# Patient Record
Sex: MALE | Race: WHITE | NOT HISPANIC OR LATINO | Employment: FULL TIME | URBAN - METROPOLITAN AREA
[De-identification: names, ages, dates, MRNs, and addresses within clinical notes are randomized per-mention and may not be internally consistent; named-entity substitution may affect disease eponyms.]

---

## 2019-12-02 ENCOUNTER — CONSULT (OUTPATIENT)
Dept: GASTROENTEROLOGY | Facility: CLINIC | Age: 49
End: 2019-12-02
Payer: COMMERCIAL

## 2019-12-02 VITALS
HEART RATE: 104 BPM | HEIGHT: 70 IN | WEIGHT: 181 LBS | BODY MASS INDEX: 25.91 KG/M2 | SYSTOLIC BLOOD PRESSURE: 122 MMHG | DIASTOLIC BLOOD PRESSURE: 74 MMHG

## 2019-12-02 DIAGNOSIS — R19.7 DIARRHEA, UNSPECIFIED TYPE: Primary | ICD-10-CM

## 2019-12-02 DIAGNOSIS — R19.4 CHANGE IN BOWEL HABITS: Primary | ICD-10-CM

## 2019-12-02 PROCEDURE — 99244 OFF/OP CNSLTJ NEW/EST MOD 40: CPT | Performed by: INTERNAL MEDICINE

## 2019-12-02 RX ORDER — LORAZEPAM 2 MG/1
2 TABLET ORAL 4 TIMES DAILY
COMMUNITY

## 2019-12-02 RX ORDER — HYDROCHLOROTHIAZIDE 12.5 MG/1
12.5 CAPSULE, GELATIN COATED ORAL DAILY
COMMUNITY
End: 2020-03-24 | Stop reason: ALTCHOICE

## 2019-12-02 RX ORDER — FEXOFENADINE HCL 180 MG/1
180 TABLET ORAL DAILY
COMMUNITY
End: 2020-03-24 | Stop reason: ALTCHOICE

## 2019-12-02 RX ORDER — NIACIN 1000 MG/1
1000 TABLET, EXTENDED RELEASE ORAL
COMMUNITY
End: 2020-03-24 | Stop reason: ALTCHOICE

## 2019-12-02 RX ORDER — ASPIRIN 81 MG/1
81 TABLET ORAL DAILY
COMMUNITY
End: 2020-04-28 | Stop reason: ALTCHOICE

## 2019-12-02 RX ORDER — LOVASTATIN 40 MG/1
40 TABLET ORAL
COMMUNITY
End: 2020-03-24 | Stop reason: ALTCHOICE

## 2019-12-02 RX ORDER — AMLODIPINE BESYLATE AND ATORVASTATIN CALCIUM 10; 40 MG/1; MG/1
1 TABLET, FILM COATED ORAL DAILY
COMMUNITY

## 2019-12-02 RX ORDER — MONTELUKAST SODIUM 10 MG/1
10 TABLET ORAL
COMMUNITY
End: 2020-03-24 | Stop reason: ALTCHOICE

## 2019-12-02 RX ORDER — ACETAMINOPHEN 160 MG
4000 TABLET,DISINTEGRATING ORAL DAILY
COMMUNITY

## 2019-12-02 RX ORDER — DICYCLOMINE HYDROCHLORIDE 10 MG/1
10 CAPSULE ORAL DAILY
Qty: 30 CAPSULE | Refills: 2 | Status: SHIPPED | OUTPATIENT
Start: 2019-12-02 | End: 2020-02-11 | Stop reason: SDUPTHER

## 2019-12-02 NOTE — PATIENT INSTRUCTIONS
Treat rectal discomfort with topical zinc oxide after stools and at bedtime   use dicyclomine once daily for colon symptoms   schedule EGD/colonoscopy,  Possibilities include irritable bowel, microscopic colitis, celiac disease, Crohn's disease/ulcerative colitis, small intestinal bacterial overgrowth      consume gluten, the equivalent of 1 slice of bread or more, daily for 1 week leading up to procedures

## 2019-12-03 NOTE — PROGRESS NOTES
[facility] - Outpatient Consultation  James Adame 51 y o  male MRN: [id number]  Encounter: [id number]    ASSESSMENT AND PLAN:      1  Diarrhea, unspecified type  Progressive complaint  Only mild improvement with dietary restrictions including a bland diet and gluten free diet  He is concerned about celiac disease due to his daughter's history  Workup includes negative celiac serologies (was already gluten free) and normal thyroid studies  Differential mainly includes IBS-D/functional diarrhea, microscopic colitis, inflammatory bowel disease, small intestinal bacterial overgrowth, or celiac disease  Patent is agreeable to colonoscopy to assess for IBD and microscopic colitis  We will perform EGD at that time to assess for for celiac disease, he will consume gluten for 1 week prior to this exam   In the interim, will try low-dose dicyclomine and titrate as indicated    - dicyclomine (BENTYL) 10 mg capsule; Take 1 capsule (10 mg total) by mouth daily  Dispense: 30 capsule; Refill: 2    2  Rectal pain  No fissure on exam   He has a small, non thrombosed internal hemorrhoid in the left lateral area  He also has skin excoriation near by  I recommended topical zinc oxide, will assess for further pathology at the time of colonoscopy  Followup Appointment:  Pending EGD/colonoscopy  ______________________________________________________________________    Chief Complaint   Patient presents with    Change in Bowel Habits, back pain, rectal pain     Referred by Dr Rashmi Miller Daughter has celiac so pt is concerned about that       HPI:   James Adame is a 51y o  year old male who presents for consultation at the request of his PCP regarding progressive diarrhea  Symptoms have been present for several years but have worsened over the past year  He complains of frequent stools throughout the day  It interferes with his work and activities of daily living    Stools are urgent and soft  He sees mucus but denies significant rectal bleeding  When entering a new place, he has to look for the bathroom immediately  He has worked on dietary manipulation, his daughter has celiac disease any started gluten free diet with minimal change in symptoms  He tried a bland diet with some improvement in severity  He denies any nocturnal symptoms  He has lost a small amount of weight with dietary changes  He denies any enedina abdominal pain  He has perianal discomfort with stools  He has tried hydrocortisone cream with little relief  He uses Culturelle with no change in symptoms  Historical Information   Past Medical History:   Diagnosis Date    ADHD     Anxiety     Hyperlipidemia      History reviewed  No pertinent surgical history  Social History     Substance and Sexual Activity   Alcohol Use Not Currently     Social History     Substance and Sexual Activity   Drug Use Not on file     Social History     Tobacco Use   Smoking Status Never Smoker   Smokeless Tobacco Never Used     Family History   Problem Relation Age of Onset    Leukemia Father     Colon cancer Neg Hx     Colon polyps Neg Hx        Meds/Allergies     Current Outpatient Medications:     amLODIPine-atorvastatin (CADUET) 10-10 MG per tablet    aspirin (ECOTRIN LOW STRENGTH) 81 mg EC tablet    cholecalciferol (VITAMIN D3) 1,000 units tablet    fexofenadine (ALLEGRA) 180 MG tablet    hydrochlorothiazide (MICROZIDE) 12 5 mg capsule    LORazepam (ATIVAN) 2 mg tablet    lovastatin (MEVACOR) 40 MG tablet    montelukast (SINGULAIR) 10 mg tablet    niacin (NIASPAN) 1000 MG CR tablet    dicyclomine (BENTYL) 10 mg capsule    PEG-KCl-NaCl-NaSulf-Na Asc-C (PLENVU) 140 g SOLR    No Known Allergies    PHYSICAL EXAM:    Blood pressure 122/74, pulse 104, height 5' 10" (1 778 m), weight 82 1 kg (181 lb)  Body mass index is 25 97 kg/m²    General Appearance: NAD, cooperative, alert, very anxious  Eyes: Anicteric, PERRLA, EOMI  ENT:  Normocephalic, atraumatic, normal mucosa  Neck:  Supple, symmetrical, trachea midline,   Resp:  Clear to auscultation bilaterally; no rales, rhonchi or wheezing; respirations unlabored   CV:  S1 S2, Regular rate and rhythm; no murmur, rub, or gallop  GI:  Soft, non-tender, non-distended; normal bowel sounds; no masses, no organomegaly   Rectal: Deferred  Musculoskeletal: No cyanosis, clubbing or edema  Normal ROM  Skin:  No jaundice, rashes, or lesions   Heme/Lymph: No palpable cervical lymphadenopathy  Psych: Normal affect, good eye contact  Neuro: No gross deficits, AAOx3    Lab Results:   No results found for: WBC, HGB, HCT, MCV, PLT  No results found for: NA, K, CL, CO2, ANIONGAP, BUN, CREATININE, GLUCOSE, GLUF, CALCIUM, CORRECTEDCA, AST, ALT, ALKPHOS, PROT, BILITOT, EGFR  No results found for: IRON, TIBC, FERRITIN  No results found for: LIPASE  Reviewed recent labs including CBC, CMP and celiac panel which were negative with the exception of a mild bilirubin elevation  (chronic)  Negative fecal occult blood  Normal TSH February 2019  Radiology Results:   No results found  REVIEW OF SYSTEMS:    CONSTITUTIONAL: Denies any fever, chills, rigors, and weight loss  HEENT: No earache or tinnitus  Denies hearing loss or visual disturbances  CARDIOVASCULAR: No chest pain or palpitations  RESPIRATORY: Denies any cough, hemoptysis, shortness of breath or dyspnea on exertion  GASTROINTESTINAL: As noted in the History of Present Illness  GENITOURINARY: No problems with urination  Denies any hematuria or dysuria  NEUROLOGIC: No dizziness or vertigo, denies headaches  MUSCULOSKELETAL: Denies any muscle or joint pain  SKIN: Denies skin rashes or itching  ENDOCRINE: Denies excessive thirst  Denies intolerance to heat or cold  PSYCHOSOCIAL: Denies depression or anxiety  Denies any recent memory loss

## 2020-01-02 PROCEDURE — 88305 TISSUE EXAM BY PATHOLOGIST: CPT | Performed by: PATHOLOGY

## 2020-01-03 ENCOUNTER — LAB REQUISITION (OUTPATIENT)
Dept: LAB | Facility: HOSPITAL | Age: 50
End: 2020-01-03
Payer: COMMERCIAL

## 2020-01-03 DIAGNOSIS — K64.0 FIRST DEGREE HEMORRHOIDS: ICD-10-CM

## 2020-01-03 DIAGNOSIS — K63.3 ULCER OF INTESTINE: ICD-10-CM

## 2020-01-03 DIAGNOSIS — R19.7 DIARRHEA, UNSPECIFIED: ICD-10-CM

## 2020-01-10 ENCOUNTER — TELEPHONE (OUTPATIENT)
Dept: GASTROENTEROLOGY | Facility: CLINIC | Age: 50
End: 2020-01-10

## 2020-01-10 DIAGNOSIS — R19.4 CHANGE IN BOWEL HABITS: Primary | ICD-10-CM

## 2020-01-10 NOTE — TELEPHONE ENCOUNTER
Dr Adrian Sit - Pt called, very nervous, states his soft palette is now rough, rigid and sore since his procedure 1/2/2020  States his gums are also red and inflamed  Does not feel as if he is starting with a cold/sore throat  Feels changes in gums are not dental related; questions if it's a reaction to bentyl recently prescribed? Or his "condition", as he states it  Is scheduled for follow up 2/11, but does not want to wait that long  Would like a call back with your suggestions/thoughts  [phone]

## 2020-01-10 NOTE — TELEPHONE ENCOUNTER
Long conversation with patient  Review pathology  He will stop Bentyl and transition to Levsin    Okay to use saltwater gargles as needed

## 2020-01-17 ENCOUNTER — TELEPHONE (OUTPATIENT)
Dept: GASTROENTEROLOGY | Facility: CLINIC | Age: 50
End: 2020-01-17

## 2020-01-17 NOTE — TELEPHONE ENCOUNTER
Call from service, [phone], I spoke with patient he follows with Dr Matthew Yo  He called today complaining of the same symptoms he saw Matthew Yo for in December, but no longer having diarrhea  Still with lower abd pain ( all across) radiating to back pain (bilateral kidney area), and rectal pain  He is anxious, embarrassed for calling, but feeling worse and doesn't know what else to do  He had an office visit with Dr Matthew Yo 12/2/19 then a combo 1/2/2020  10 year colon rectal and NO EGD recall  He has originally prescribed bentyl, which helped with the pain, but caused moderate dry mouth, inflamed gums  Dr Matthew Yo recommended stopping bentyl and prescribed Levsin  Patient feels the Mardene Isiah is helping to bind him up as he is no longer having the diarrhea  He's been taking the Levsin for a week now; having to pay out of pocket as it's not covered by insurance  On the other hand his lower abd, back & rectal pain have not improved  He's afraid to eat  Not sleeping at night  The zinc oxide is not helping his rectal pain/itching  It's almost like his rectum is raw/excoriated  Nothing noted on colonoscopy  I advised he try tucks pads to help with the itching  Let the area air dry then apply antifungal cream/zinc oxide QID and prn  Please call patient with further recommendations for pain   [phone]

## 2020-02-11 ENCOUNTER — OFFICE VISIT (OUTPATIENT)
Dept: GASTROENTEROLOGY | Facility: CLINIC | Age: 50
End: 2020-02-11
Payer: COMMERCIAL

## 2020-02-11 VITALS
SYSTOLIC BLOOD PRESSURE: 122 MMHG | HEIGHT: 70 IN | HEART RATE: 66 BPM | BODY MASS INDEX: 24.48 KG/M2 | WEIGHT: 171 LBS | DIASTOLIC BLOOD PRESSURE: 84 MMHG

## 2020-02-11 DIAGNOSIS — R19.7 DIARRHEA, UNSPECIFIED TYPE: ICD-10-CM

## 2020-02-11 DIAGNOSIS — K58.0 IRRITABLE BOWEL SYNDROME WITH DIARRHEA: ICD-10-CM

## 2020-02-11 DIAGNOSIS — K59.1 FUNCTIONAL DIARRHEA: Primary | ICD-10-CM

## 2020-02-11 DIAGNOSIS — K62.89 RECTAL PAIN: ICD-10-CM

## 2020-02-11 PROCEDURE — 99215 OFFICE O/P EST HI 40 MIN: CPT | Performed by: INTERNAL MEDICINE

## 2020-02-11 RX ORDER — DICYCLOMINE HYDROCHLORIDE 10 MG/1
10 CAPSULE ORAL DAILY
Qty: 30 CAPSULE | Refills: 2 | Status: SHIPPED | OUTPATIENT
Start: 2020-02-11 | End: 2020-04-28 | Stop reason: ALTCHOICE

## 2020-02-11 NOTE — PROGRESS NOTES
[facility] - Outpatient Follow-up Note  Nay Rebollar 51 y o  male MRN: [id number]  Encounter: [id number]    ASSESSMENT AND PLAN:      1  Functional diarrhea  2  Irritable bowel syndrome with diarrhea  No etiology on EGD/colonoscopy  Diarrhea has improved with dicyclomine, dosing is limited by dry mouth  Levsin was not effective  He continues a low FODMAP diet and is restricting dairy  Will proceed with a breath test for bacterial overgrowth  If negative, will transition to [address] [place] [address]  With his anxiety, he may benefit from amitriptyline  Recent blood tests suggested lactose intolerance, he will continue to minimize dairy as before  3  Rectal pain  Rectal excoriations, recommended zinc oxide and wet wipes  - dicyclomine (BENTYL) 10 mg capsule; Take 1 capsule (10 mg total) by mouth daily  Dispense: 30 capsule; Refill: 2    I have spent 45 minutes with Patient  today in which greater than 50% of this time was spent in counseling/coordination of care regarding Diagnostic results, Prognosis and Intructions for management  Followup Appointment:  Pending breath test, keep March office visit  ______________________________________________________________________    Chief Complaint   Patient presents with    Follow-up     scope    Lactose Intolerance     HPI:  The patient returns for follow-up on diarrhea and abdominal pain  He was last seen at the time of EGD/colonoscopy which were negative for celiac disease, IBD, and microscopic colitis  He was prescribed Bentyl but dosing was limited by dry mouth  We transition to Levsin but was not as effective  He is currently using Bentyl with control diarrhea  He still has abdominal cramping along with flank pain  He denies any nausea or vomiting  Stools are more formed and less mucousy  He still has rectal burning  Still is very anxious about determining an exact diagnosis  He still significantly limits calories    He adheres to a low FODMAP diet and minimizes dairy  He has introduced gluten  Despite his diarrhea improving he is still anxious to eat at restaurants  Historical Information   Past Medical History:   Diagnosis Date    ADHD     Anxiety     Hyperlipidemia      No past surgical history on file  Social History     Substance and Sexual Activity   Alcohol Use Not Currently     Social History     Substance and Sexual Activity   Drug Use Not on file     Social History     Tobacco Use   Smoking Status Never Smoker   Smokeless Tobacco Never Used     Family History   Problem Relation Age of Onset    Leukemia Father     Colon cancer Neg Hx     Colon polyps Neg Hx          Current Outpatient Medications:     amLODIPine-atorvastatin (CADUET) 10-10 MG per tablet    cholecalciferol (VITAMIN D3) 1,000 units tablet    dicyclomine (BENTYL) 10 mg capsule    fexofenadine (ALLEGRA) 180 MG tablet    LORazepam (ATIVAN) 2 mg tablet    aspirin (ECOTRIN LOW STRENGTH) 81 mg EC tablet    hydrochlorothiazide (MICROZIDE) 12 5 mg capsule    lovastatin (MEVACOR) 40 MG tablet    montelukast (SINGULAIR) 10 mg tablet    niacin (NIASPAN) 1000 MG CR tablet  No Known Allergies  Reviewed medications and allergies and updated as indicated    PHYSICAL EXAM:    Blood pressure 122/84, pulse 66, height 5' 10" (1 778 m), weight 77 6 kg (171 lb)  Body mass index is 24 54 kg/m²  General Appearance: NAD, cooperative, alert  Eyes: Anicteric, PERRLA, EOMI  ENT:  Normocephalic, atraumatic, normal mucosa  Neck:  Supple, symmetrical, trachea midline  Resp:  Clear to auscultation bilaterally; no rales, rhonchi or wheezing; respirations unlabored   CV:  S1 S2, Regular rate and rhythm; no murmur, rub, or gallop  GI:  Soft, non-tender, non-distended; normal bowel sounds; no masses, no organomegaly   Rectal:  Perirectal excoriations  Musculoskeletal: No cyanosis, clubbing or edema  Normal ROM    Skin:  No jaundice, rashes, or lesions   Heme/Lymph: No palpable cervical lymphadenopathy  Psych: Normal affect, good eye contact  Neuro: No gross deficits, AAOx3    Lab Results:   No results found for: WBC, HGB, HCT, MCV, PLT  No results found for: NA, K, CL, CO2, ANIONGAP, BUN, CREATININE, GLUCOSE, GLUF, CALCIUM, CORRECTEDCA, AST, ALT, ALKPHOS, PROT, BILITOT, EGFR  No results found for: IRON, TIBC, FERRITIN  No results found for: LIPASE    Radiology Results:   No results found

## 2020-02-17 ENCOUNTER — CLINICAL SUPPORT (OUTPATIENT)
Dept: GASTROENTEROLOGY | Facility: CLINIC | Age: 50
End: 2020-02-17
Payer: COMMERCIAL

## 2020-02-17 DIAGNOSIS — K59.1 FUNCTIONAL DIARRHEA: ICD-10-CM

## 2020-02-17 DIAGNOSIS — K58.0 IRRITABLE BOWEL SYNDROME WITH DIARRHEA: Primary | ICD-10-CM

## 2020-02-17 PROCEDURE — 91065 BREATH HYDROGEN/METHANE TEST: CPT | Performed by: INTERNAL MEDICINE

## 2020-02-17 NOTE — PROGRESS NOTES
[facility]  [address] [place] [address]   [phone]    Bacterial Overgrowth Analytical Record    Damion Ponce 51 y o  male MRN: [id number]      Date of Test: 2/17/2020    Substrate Given: Lactulose    Ordering Provider: Dr Jackeline Kern Assistant: All    Symptoms: diarrhea and IBS     The patient presents for bacterial overgrowth testing  Patient fasted overnight  Baseline readings obtained  substrate was administered, and the patient drink without difficulty  Breath test performed every 20 min for a total of 3 hr    Sample Clock Time ppmH2 ppmCH4 Co2% Elvi   Baseline 8:04 am   0 2 5 4 0 99   #1  20 minutes 8:29 am 3 2 4 6 1 15   #2  40 minutes 8:49 am 5 2 4 6 1 15   #3  60 minutes 9:09 am 5 2 4 9 1 08   #4  80 minutes 9:29 am 5 2 5 2 1 01   #5  100 minutes 9:49 am 6 2 5 3 1 00   #6  120 minutes 10:09 am 17 3 5 3 1 00   #7  140 minutes 10:30 am 41 4 4 9 1 08   #8  160 minutes 10:50 am 56 5 5 5 0 97   #9  180 minutes 11:10 am 55 5 4 2 1 26       Physician interpretation:  Negative study    Patient contacted via portal (I replied to his portal message)  Will prescribe Viberzi  Keep upcoming office visit

## 2020-03-24 ENCOUNTER — TELEMEDICINE (OUTPATIENT)
Dept: GASTROENTEROLOGY | Facility: CLINIC | Age: 50
End: 2020-03-24
Payer: COMMERCIAL

## 2020-03-24 VITALS — WEIGHT: 178 LBS | BODY MASS INDEX: 25.48 KG/M2 | HEIGHT: 70 IN

## 2020-03-24 DIAGNOSIS — K58.0 IRRITABLE BOWEL SYNDROME WITH DIARRHEA: ICD-10-CM

## 2020-03-24 PROCEDURE — 99213 OFFICE O/P EST LOW 20 MIN: CPT | Performed by: INTERNAL MEDICINE

## 2020-03-24 RX ORDER — PROPRANOLOL/HYDROCHLOROTHIAZID 40 MG-25MG
1 TABLET ORAL DAILY
COMMUNITY
End: 2021-03-15 | Stop reason: ALTCHOICE

## 2020-03-24 NOTE — LETTER
March 24, 2020     MD Samina [address]  [address]    Patient: Dede Vizcarra   YOB: 1970   Date of Visit: 3/24/2020       Dear Dr Leah Pozo: Thank you for referring Dede Vizcarra to me for evaluation  Below are my notes for this consultation  If you have questions, please do not hesitate to call me  I look forward to following your patient along with you  Sincerely,        Diaz Miller DO        CC: No Recipients  Diaz Miller DO  3/24/2020  5:16 PM  Sign at close encounter    Virtual Regular Visit    Problem List Items Addressed This Visit     None      Visit Diagnoses     Irritable bowel syndrome with diarrhea        Relevant Medications    Eluxadoline 75 MG TABS        1, IBS-D  significant improvement with the addition of Viberzi  He is currently taking a half tablet once daily  This is much less than the standard dosing  Will transition to 75 mg, initially once daily titrating as tolerated  Will check basic labs to assess for dehydration with his urination complaints  Reason for visit is follow-up on IBS-D    Encounter provider Diaz Miller DO    Provider located at [address] [address] [phone]      Recent Visits  No visits were found meeting these conditions  Showing recent visits within past 7 days and meeting all other requirements     Future Appointments  No visits were found meeting these conditions  Showing future appointments within next 150 days and meeting all other requirements        After connecting through [employer], the patient was identified by name and date of birth  Dede Vizcarra was informed that this is a telemedicine visit and that the visit is being conducted through [address] [place] [address] which may not be secure and therefore, might not be HIPAA-compliant  My office door was closed   No one else was in the room   He acknowledged consent and understanding of privacy and security of the video platform  The patient has agreed to participate and understands they can discontinue the visit at any time  Subjective  Lalitha Segovia is a 53 y o  male who presents for via virtual visit for follow-up on IBS-d  In February  He complained of dry mouth with dicyclomine  He was started on Viberzi 100 mg twice daily  Taking it twice daily lead to constipation  He current takes half tablet once daily  He has about 4 formed stools daily with no further diarrhea  He is much less mucus per rectum  He denies any abdominal pain  He still complains of some throat dryness  He denies any significant rectal bleeding  His main complaint today is increased urination and some nocturia  He denies any urinary bleeding  He still adheres to FODMAP diet but would like to advance his diet  Past Medical History:   Diagnosis Date    ADHD     Anxiety     Hyperlipidemia        Past Surgical History:   Procedure Laterality Date    COLONOSCOPY      10 year recall       Current Outpatient Medications   Medication Sig Dispense Refill    amLODIPine-atorvastatin (CADUET) 10-40 MG per tablet Take 1 tablet by mouth daily       Cholecalciferol (VITAMIN D3) 50 MCG (2000 UT) capsule Take 4,000 Units by mouth daily       LORazepam (ATIVAN) 2 mg tablet Take 2 mg by mouth 4 (four) times a day      Multiple Vitamins-Minerals (CENTRUM SILVER PO) Take 1 tablet by mouth daily      Turmeric 500 MG CAPS Take 1 capsule by mouth daily      aspirin (ECOTRIN LOW STRENGTH) 81 mg EC tablet Take 81 mg by mouth daily      dicyclomine (BENTYL) 10 mg capsule Take 1 capsule (10 mg total) by mouth daily 30 capsule 2    Eluxadoline 75 MG TABS Take 1 tablet (75 mg total) by mouth 2 (two) times a day 60 tablet 5     No current facility-administered medications for this visit  No Known Allergies    Review of Systems   Eyes: Positive for itching  Genitourinary: Positive for frequency  All other systems reviewed and are negative  Physical Exam   Constitutional: He is oriented to person, place, and time  He appears well-developed and well-nourished  HENT:   Head: Normocephalic  Eyes: Pupils are equal, round, and reactive to light  Neck: Normal range of motion  Pulmonary/Chest: Effort normal    Abdominal: Soft  Neurological: He is alert and oriented to person, place, and time  Skin: Skin is warm and dry  Psychiatric: He has a normal mood and affect  His behavior is normal         I spent 24 minutes with the patient during this visit

## 2020-03-28 LAB
ALBUMIN SERPL-MCNC: 4.6 G/DL (ref 4–5)
ALBUMIN/GLOB SERPL: 2.1 {RATIO} (ref 1.2–2.2)
ALP SERPL-CCNC: 60 IU/L (ref 39–117)
ALT SERPL-CCNC: 14 IU/L (ref 0–44)
AST SERPL-CCNC: 16 IU/L (ref 0–40)
BILIRUB SERPL-MCNC: 2 MG/DL (ref 0–1.2)
BUN SERPL-MCNC: 15 MG/DL (ref 6–24)
BUN/CREAT SERPL: 16 (ref 9–20)
CALCIUM SERPL-MCNC: 9.2 MG/DL (ref 8.7–10.2)
CHLORIDE SERPL-SCNC: 98 MMOL/L (ref 96–106)
CO2 SERPL-SCNC: 28 MMOL/L (ref 20–29)
CREAT SERPL-MCNC: 0.96 MG/DL (ref 0.76–1.27)
ERYTHROCYTE [DISTWIDTH] IN BLOOD BY AUTOMATED COUNT: 13.4 % (ref 11.6–15.4)
EST. AVERAGE GLUCOSE BLD GHB EST-MCNC: 114 MG/DL
GLOBULIN SER-MCNC: 2.2 G/DL (ref 1.5–4.5)
GLUCOSE SERPL-MCNC: 82 MG/DL (ref 65–99)
HBA1C MFR BLD: 5.6 % (ref 4.8–5.6)
HCT VFR BLD AUTO: 42.5 % (ref 37.5–51)
HGB BLD-MCNC: 14.1 G/DL (ref 13–17.7)
MCH RBC QN AUTO: 29.1 PG (ref 26.6–33)
MCHC RBC AUTO-ENTMCNC: 33.2 G/DL (ref 31.5–35.7)
MCV RBC AUTO: 88 FL (ref 79–97)
PLATELET # BLD AUTO: 297 X10E3/UL (ref 150–450)
POTASSIUM SERPL-SCNC: 4.3 MMOL/L (ref 3.5–5.2)
PROT SERPL-MCNC: 6.8 G/DL (ref 6–8.5)
RBC # BLD AUTO: 4.84 X10E6/UL (ref 4.14–5.8)
SL AMB EGFR AFRICAN AMERICAN: 107 ML/MIN/1.73
SL AMB EGFR NON AFRICAN AMERICAN: 92 ML/MIN/1.73
SODIUM SERPL-SCNC: 139 MMOL/L (ref 134–144)
TSH SERPL DL<=0.005 MIU/L-ACNC: 1.57 UIU/ML (ref 0.45–4.5)
WBC # BLD AUTO: 5.7 X10E3/UL (ref 3.4–10.8)

## 2020-03-30 ENCOUNTER — TELEPHONE (OUTPATIENT)
Dept: GASTROENTEROLOGY | Facility: CLINIC | Age: 50
End: 2020-03-30

## 2020-03-30 NOTE — TELEPHONE ENCOUNTER
Patient advised Kenney Pacheco is off today and then rounding and would px hear later this week re: results

## 2020-03-30 NOTE — TELEPHONE ENCOUNTER
Pt asks for CB to [phone] w/ results of labs done Sat at [facility]  He can see partial info on [employer]

## 2020-04-01 LAB
BEEF IGE QN: <0.1 KU/L
CHOCOLATE IGE QN: <0.1 KU/L
CORN IGE QN: <0.1 KU/L
COW MILK IGE QN: <0.1 KU/L
FOOD ALLERG MIX2 IGE QL: NEGATIVE
Lab: NORMAL
PEANUT IGE QN: <0.1 KU/L
PORK IGE QN: <0.1 KU/L
SOYBEAN IGE QN: <0.1 KU/L
WHEAT IGE QN: <0.1 KU/L
WHOLE EGG IGE QN: <0.1 KU/L

## 2020-04-28 ENCOUNTER — TELEMEDICINE (OUTPATIENT)
Dept: GASTROENTEROLOGY | Facility: CLINIC | Age: 50
End: 2020-04-28
Payer: COMMERCIAL

## 2020-04-28 VITALS — HEIGHT: 70 IN | BODY MASS INDEX: 25.62 KG/M2 | WEIGHT: 179 LBS

## 2020-04-28 DIAGNOSIS — K58.0 IRRITABLE BOWEL SYNDROME WITH DIARRHEA: ICD-10-CM

## 2020-04-28 PROCEDURE — 99213 OFFICE O/P EST LOW 20 MIN: CPT | Performed by: INTERNAL MEDICINE

## 2020-07-07 ENCOUNTER — OFFICE VISIT (OUTPATIENT)
Dept: GASTROENTEROLOGY | Facility: CLINIC | Age: 50
End: 2020-07-07
Payer: COMMERCIAL

## 2020-07-07 VITALS
BODY MASS INDEX: 28.06 KG/M2 | DIASTOLIC BLOOD PRESSURE: 70 MMHG | HEART RATE: 96 BPM | HEIGHT: 70 IN | WEIGHT: 196 LBS | SYSTOLIC BLOOD PRESSURE: 112 MMHG | TEMPERATURE: 98.5 F

## 2020-07-07 DIAGNOSIS — K58.2 IRRITABLE BOWEL SYNDROME WITH BOTH CONSTIPATION AND DIARRHEA: Primary | ICD-10-CM

## 2020-07-07 DIAGNOSIS — K62.89 RECTAL PAIN: ICD-10-CM

## 2020-07-07 PROCEDURE — 99213 OFFICE O/P EST LOW 20 MIN: CPT | Performed by: INTERNAL MEDICINE

## 2020-07-07 NOTE — PROGRESS NOTES
[facility] - Outpatient Follow-up Note  Arvin Sports 53 y o  male MRN: [id number]  Encounter: [id number]    ASSESSMENT AND PLAN:      1  Irritable bowel syndrome with both constipation and diarrhea  Improving with Viberzi and titrating duloxetine  I recommended adding a daily fiber supplement like Benefiber  Titrate diet as tolerated    2  Rectal pain  Rectal excoriations, recommended Vaseline  Likely related to toilet tissue      Followup Appointment:  6 weeks  ______________________________________________________________________    Chief Complaint   Patient presents with    Follow-up     diarrhea     HPI:  The patient returns for follow-up on IBS  He was last seen via tele health April 28th  Since then he increased Viberzi, he takes 1/2 of a 50 mg tablet daily  He typically has 3 or 4 stools daily, varying between loose and firm  Stools are less urgent  He is less abdominal discomfort  His appetite is improving is gained some weight  He has also titrating duloxetine, currently taking a total of 90 mg daily with plans to increase to 120  He relates symptoms were initially a 10/10, now they are about a 4/10      Historical Information   Past Medical History:   Diagnosis Date    ADHD     Anxiety     Hyperlipidemia      Past Surgical History:   Procedure Laterality Date    COLONOSCOPY      10 year recall     Social History     Substance and Sexual Activity   Alcohol Use Not Currently     Social History     Substance and Sexual Activity   Drug Use Not on file     Social History     Tobacco Use   Smoking Status Never Smoker   Smokeless Tobacco Never Used     Family History   Problem Relation Age of Onset    Leukemia Father     Colon cancer Neg Hx     Colon polyps Neg Hx          Current Outpatient Medications:     amLODIPine-atorvastatin (CADUET) 10-40 MG per tablet    Cholecalciferol (VITAMIN D3) 50 MCG (2000 UT) capsule    DULoxetine (CYMBALTA) 20 mg capsule    Eluxadoline (Viberzi) 100 MG TABS    LORazepam (ATIVAN) 2 mg tablet    Multiple Vitamins-Minerals (CENTRUM SILVER PO)    Turmeric 500 MG CAPS  No Known Allergies  Reviewed medications and allergies and updated as indicated    PHYSICAL EXAM:    Blood pressure 112/70, pulse 96, temperature 98 5 °F (36 9 °C), height 5' 10" (1 778 m), weight 88 9 kg (196 lb)  Body mass index is 28 12 kg/m²  General Appearance: NAD, cooperative, alert  Eyes: Anicteric, PERRLA, EOMI  ENT:  Normocephalic, atraumatic, normal mucosa  Neck:  Supple, symmetrical, trachea midline  Resp:  Clear to auscultation bilaterally; no rales, rhonchi or wheezing; respirations unlabored   CV:  S1 S2, Regular rate and rhythm; no murmur, rub, or gallop  GI:  Soft, non-tender, non-distended; normal bowel sounds; no masses, no organomegaly   Rectal:  Perianal erythema without ulcer  Musculoskeletal: No cyanosis, clubbing or edema  Normal ROM  Skin:  No jaundice, rashes, or lesions   Heme/Lymph: No palpable cervical lymphadenopathy  Psych: Normal affect, good eye contact  Neuro: No gross deficits, AAOx3    Lab Results:   Lab Results   Component Value Date    WBC 5 7 03/27/2020    HGB 14 1 03/27/2020    HCT 42 5 03/27/2020    MCV 88 03/27/2020     03/27/2020     Lab Results   Component Value Date    K 4 3 03/27/2020    CL 98 03/27/2020    CO2 28 03/27/2020    BUN 15 03/27/2020    CREATININE 0 96 03/27/2020    AST 16 03/27/2020    ALT 14 03/27/2020     No results found for: IRON, TIBC, FERRITIN  No results found for: LIPASE    Radiology Results:   No results found

## 2020-09-08 ENCOUNTER — OFFICE VISIT (OUTPATIENT)
Dept: GASTROENTEROLOGY | Facility: CLINIC | Age: 50
End: 2020-09-08
Payer: COMMERCIAL

## 2020-09-08 VITALS
HEIGHT: 70 IN | WEIGHT: 200 LBS | BODY MASS INDEX: 28.63 KG/M2 | DIASTOLIC BLOOD PRESSURE: 64 MMHG | SYSTOLIC BLOOD PRESSURE: 108 MMHG

## 2020-09-08 DIAGNOSIS — R19.4 CHANGE IN BOWEL HABITS: ICD-10-CM

## 2020-09-08 DIAGNOSIS — K58.0 IRRITABLE BOWEL SYNDROME WITH DIARRHEA: Primary | ICD-10-CM

## 2020-09-08 PROCEDURE — 99213 OFFICE O/P EST LOW 20 MIN: CPT | Performed by: INTERNAL MEDICINE

## 2020-09-08 NOTE — PROGRESS NOTES
[facility] - Outpatient Follow-up Note  Josue Sanchez 53 y o  male MRN: [id number]  Encounter: [id number]    ASSESSMENT AND PLAN:      1  Irritable bowel syndrome with diarrhea  Fair control with duloxetine and Viberzi, he takes a half tablet (50 mg) once daily, he had bloating with a full dose  We discuss complementary medications  He will add IBD guard titrating as needed  He also asked to be test read for EPI-fecal fat will be obtained  If the above is unrevealing and symptoms persist, will adjust Viberzi  To 50 mg twice daily      Followup Appointment:  6 weeks  ______________________________________________________________________    Chief Complaint   Patient presents with    Irritable Bowel Syndrome     HPI:  The patient presents for follow-up on IBS-D  Symptoms are generally controlled during the day that he has increased bloating in the evening  He denies any specific food triggers  He continues to complain of rectal discomfort  but denies rectal bleeding or pain with defecation      Historical Information   Past Medical History:   Diagnosis Date    ADHD     Anxiety     Hyperlipidemia      Past Surgical History:   Procedure Laterality Date    COLONOSCOPY      10 year recall     Social History     Substance and Sexual Activity   Alcohol Use Not Currently     Social History     Substance and Sexual Activity   Drug Use Not on file     Social History     Tobacco Use   Smoking Status Never Smoker   Smokeless Tobacco Never Used     Family History   Problem Relation Age of Onset    Leukemia Father     Colon cancer Neg Hx     Colon polyps Neg Hx          Current Outpatient Medications:     amLODIPine-atorvastatin (CADUET) 10-40 MG per tablet    Cholecalciferol (VITAMIN D3) 50 MCG (2000 UT) capsule    DULoxetine (CYMBALTA) 20 mg capsule    Eluxadoline (Viberzi) 100 MG TABS    LORazepam (ATIVAN) 2 mg tablet    Multiple Vitamins-Minerals (CENTRUM SILVER PO)    Turmeric 500 MG CAPS  No Known Allergies  Reviewed medications and allergies and updated as indicated    PHYSICAL EXAM:    Blood pressure 108/64, height 5' 10" (1 778 m), weight 90 7 kg (200 lb)  Body mass index is 28 7 kg/m²  General Appearance: NAD, cooperative, alert  Eyes: Anicteric, PERRLA, EOMI  ENT:  Normocephalic, atraumatic, normal mucosa  Neck:  Supple, symmetrical, trachea midline  Resp:  Clear to auscultation bilaterally; no rales, rhonchi or wheezing; respirations unlabored   CV:  S1 S2, Regular rate and rhythm; no murmur, rub, or gallop  GI:  Soft, non-tender, non-distended; normal bowel sounds; no masses, no organomegaly   Rectal: Deferred  Musculoskeletal: No cyanosis, clubbing or edema  Normal ROM  Skin:  No jaundice, rashes, or lesions   Heme/Lymph: No palpable cervical lymphadenopathy  Psych: Normal affect, good eye contact  Neuro: No gross deficits, AAOx3    Lab Results:   Lab Results   Component Value Date    WBC 5 7 03/27/2020    HGB 14 1 03/27/2020    HCT 42 5 03/27/2020    MCV 88 03/27/2020     03/27/2020     Lab Results   Component Value Date    K 4 3 03/27/2020    CL 98 03/27/2020    CO2 28 03/27/2020    BUN 15 03/27/2020    CREATININE 0 96 03/27/2020    AST 16 03/27/2020    ALT 14 03/27/2020     No results found for: IRON, TIBC, FERRITIN  No results found for: LIPASE    Radiology Results:   No results found

## 2020-09-24 LAB
FAT STL QL: NORMAL
NEUTRAL FAT STL QL: NORMAL

## 2020-10-23 ENCOUNTER — OFFICE VISIT (OUTPATIENT)
Dept: GASTROENTEROLOGY | Facility: CLINIC | Age: 50
End: 2020-10-23
Payer: COMMERCIAL

## 2020-10-23 VITALS
DIASTOLIC BLOOD PRESSURE: 76 MMHG | HEIGHT: 70 IN | TEMPERATURE: 97.7 F | SYSTOLIC BLOOD PRESSURE: 122 MMHG | BODY MASS INDEX: 28.63 KG/M2 | WEIGHT: 200 LBS

## 2020-10-23 DIAGNOSIS — F41.9 ANXIETY: ICD-10-CM

## 2020-10-23 DIAGNOSIS — K58.0 IRRITABLE BOWEL SYNDROME WITH DIARRHEA: Primary | ICD-10-CM

## 2020-10-23 PROCEDURE — 99213 OFFICE O/P EST LOW 20 MIN: CPT | Performed by: INTERNAL MEDICINE

## 2020-10-23 RX ORDER — ELUXADOLINE 75 MG/1
75 TABLET, FILM COATED ORAL DAILY
Qty: 30 TABLET | Refills: 2 | Status: SHIPPED | OUTPATIENT
Start: 2020-10-23 | End: 2020-10-27

## 2020-10-27 ENCOUNTER — TELEPHONE (OUTPATIENT)
Dept: GASTROENTEROLOGY | Facility: CLINIC | Age: 50
End: 2020-10-27

## 2020-10-27 DIAGNOSIS — K58.0 IRRITABLE BOWEL SYNDROME WITH DIARRHEA: Primary | ICD-10-CM

## 2020-10-27 RX ORDER — ELUXADOLINE 75 MG/1
75 TABLET, FILM COATED ORAL 2 TIMES DAILY
Qty: 60 TABLET | Refills: 5 | Status: SHIPPED | OUTPATIENT
Start: 2020-10-27 | End: 2021-05-20

## 2020-12-27 NOTE — PROGRESS NOTES
[address]  [address]                    CARDIAC CATH REPORT           
_______________________________________________________________________________
 
Name:       OMID NEVAREZ               Room:           [address]    ADM IN  
[facility]#:  [id number]      Account #:      [id number]  
Admission:  12/26/20     Attend Phys:    Nilo Mathews MD, F
Discharge:               Date of Birth:  [birth year]  
         Report #: [id number]
                                                                     [id number]
_______________________________________________________________________________
THIS REPORT FOR:  
 
cc:  Aleksander Corcoran MD, Rene P. MD                                                   ~
     Nilo Mathews MD [facility]        
 
 
--------------- APPROVED REPORT --------------
 
 
Study performed:  12/26/2020 00:44:19
 
Patient Details
Patient Status: ED                  Room #: 
The patient is a 79 year-old male
 
Event Personnel
Alma Delia Hughes, [facility] RTR Scrub, Angeline Parsons RN 
RN, Nilo Mathews  Interventional Cardiologist
 
Procedures Performed
Art Access - R femoral artery*  Left Heart Cath w/LT VGram [id number] 
LHCLV ERNESTINE Revasc AMI Total/Sub Single PDA  
AMIREVSING
 
Indication
Abnormal ECG, STEMI (>0 to less than or equal to 6 hours), Syncope,
 Chest pain
 
Risk Factors
Arterial Hypertension, Cerebrovascular Disease, Diabetes 
 
Admission/Lab Medications/Medications given during 
procedure
Glycoprotein IllbIlla Inhibitors, Heparin Unfract.
 
Procedure Narrative
The patient was brought emergently to the Cardiac Catheterization 
Laboratory and was prepped and draped in a sterile manner. The right 
femoral was infiltrated with 2% Lidocaine subcutaneous anesthesia. A 
[place] 6 FR sheath was inserted into the right femoral artery. 
Coronary angiography was performed using coronary diagnostic 
catheters. The right coronary system was accessed and visualized with 
a Diagnostic catheter. The left coronary system was accessed and 
visualized with a Diagnostic catheter. The left ventricle was 
accessed and visualized with a Diagnostic catheter. Left 
 
 
 
[place] [address]                    CARDIAC CATH REPORT           
_______________________________________________________________________________
 
Name:       OMID NEVAREZ               Room:           [address]#:  [id number]      Account #:      [id number]  
Admission:  12/26/20     Attend Phys:    Nilo Mathews MD, F
Discharge:               Date of Birth:  [birth year]  
         Report #: [id number]
                                                                     [id number]
_______________________________________________________________________________
 
ventricular/Aortic Valve gradient assessed via catheter pullback. 
Left ventriculogram was performed in RAJAN projection. Closure device 
was deployed with a 6 Fr Angioseal STS 6Fr. The patient tolerated the 
procedure well and there were no complications associated with the 
procedure. There was no hematoma.
 
Intraoperative Conscious Sedation
No sedation given.   
 
Contrast Type and Amount:  Visipaque 105 ml   
 
Coronary Angiography
The patient's coronary anatomy is right dominant. 
 
Diagnostic Cath
Left Main 0% stenosis
LAD  80% distal stenosis
Diagonal 2 60% mid stenosis
Circumflex 30% proximal stenosis
L ERIKA  80% mid stenosis
Right Coronary 40% proximal stenosis
R PDA  95% mid stenosis
 
Left Ventriculography
The left ventricular ejection fraction is estimated to be 40-45%. 
Left ventricular wall motion abnormalities are present. There is no 
mitral insufficiency. distal linferior wall moderate hypokinesis 
noted
 
Hemodynamics
The aortic pressure is 126/66 mmHg with a mean of 77 mmHg. The left 
ventricular pressure is 137/15 mmHg with a mean of mmHg. The left 
ventricular end diastolic pressure is 18 mmHg. There was no gradient 
across the aortic valve upon pullback. Pullback from the left 
ventricle to the aorta revealed no gradient across the aortic 
valve.
 
PCI Technique Lesion
Anticoagulation was achieved with Heparin. bolus of iv aggrastat 
given Percutaneous coronary intervention was performed on the right 
posterior descending artery. The lesion stenosis prior to 
intervention was 95% with ROMINA 3 flow. A 6FR JCR 4 100CM Guide 
Catheter was used to engage the rca ostium. A IG: BMW 190cm 
Interventional Guidewire was used to cross the lesion.
 
 
 
 
[place] [address]                    CARDIAC CATH REPORT           
_______________________________________________________________________________
 
Name:       ROQUEOMID T               Room:           [address] [place]  
[facility].#:  [id number]      Account #:      [id number]  
Admission:  12/26/20     Attend Phys:    Nilo Mathews MD, F
Discharge:               Date of Birth:  [birth year]  
         Report #: [id number]
                                                                     [id number]
_______________________________________________________________________________
 
BALLOON DILATION
A Balloon catheter Mini Trek RX 2.0 X 8 was inserted and inflated up 
to 6.00atm for 13seconds. Repeat angiography revealed the following 
post-dilatation results: 40% stenosis. Additional Inflation: 9.00atm 
for 13seconds. Additional Inflation: 9.00atm for 11seconds.
 
STENT DEPLOYMENT
A drug-eluting stent Phoenix RX Stent 2.0X12mm was inserted and inflated 
up to 8.00atm for 10seconds. Repeat angiography revealed the 
following post-stent deployment results: 0% stenosis. Additional 
Inflation: 8.00atm for 9seconds. Additional Inflation: 10.00atm for 
13seconds. Additional Inflation: 10.00 sierra for 8 seconds.
 
Final angiography reveals 0 % stenosis with ROMINA 3 
flow.
 
Conclusion
1.  80% stenosis of the distal LAD
2.  80% stenosis of the posterolateral branch of the circumflex.
3.  95% stenosis of the posterior descending branch of the RCA
4.  successful placement of a drug eluting stent in the posterior 
descending branch
5.  LVEF 40-45% 
 
Recommendations
If recurrent angina despite medications, consider stenting of the LAD 
and posterolateral branch of the circumflex
 
Medications Administered
Clopidogrel
 
 
 
 
 
 
 
 
 
 
 
 
 
<ELECTRONICALLY SIGNED>
                                        By:  Nilo Mathews MD, [facility]      
12/27/20     1307
D: 12/27/20 1307_______________________________________
T: 12/27/20 1307Dadexter Mathews MD, FACC         /INF Pt is scheduled with Dr Rodas Figures

## 2020-12-30 ENCOUNTER — OFFICE VISIT (OUTPATIENT)
Dept: GASTROENTEROLOGY | Facility: CLINIC | Age: 50
End: 2020-12-30
Payer: COMMERCIAL

## 2020-12-30 VITALS
BODY MASS INDEX: 32.5 KG/M2 | DIASTOLIC BLOOD PRESSURE: 82 MMHG | WEIGHT: 227 LBS | SYSTOLIC BLOOD PRESSURE: 120 MMHG | HEART RATE: 92 BPM | HEIGHT: 70 IN

## 2020-12-30 DIAGNOSIS — K58.0 IRRITABLE BOWEL SYNDROME WITH DIARRHEA: Primary | ICD-10-CM

## 2020-12-30 PROCEDURE — 99213 OFFICE O/P EST LOW 20 MIN: CPT | Performed by: INTERNAL MEDICINE

## 2021-01-06 ENCOUNTER — TELEPHONE (OUTPATIENT)
Dept: GASTROENTEROLOGY | Facility: CLINIC | Age: 51
End: 2021-01-06

## 2021-01-06 NOTE — TELEPHONE ENCOUNTER
Reviewed last ov note, appears the note was faxed to Dr Bishop Rosen on 12/30  Called pt back, he said Dr Theresa Benson noted at this ov that he was going to call Dr Bishop Rosen to discuss his treatment  He is questioning if there is any additional recommendations from that discussion?

## 2021-01-06 NOTE — TELEPHONE ENCOUNTER
Pt states he is following up to last [address]; has ques about KK following up w/ his primary/asks if they spoke or he sent a note? Req CB# [phone]

## 2021-01-11 NOTE — TELEPHONE ENCOUNTER
Pt states Ins Co needs prior auth  Pt's Ins changed 1/1  Pt req PA for Viberzi, Rx [facility]  If Jose Alberto # [id number]  Transf'd call to  to update Ins info  Pt left separate VMM asking to spk w/ a nurse re: Dahlia Po; other meds he tried didn't work and this one works somewhat; req  [phone]  Pt called questioning status of med PA req  I noted we had spoken earlier and rec'd his VM mssg/put him on hold to see if someone was working on this  Call was either lost or he hung up

## 2021-01-11 NOTE — TELEPHONE ENCOUNTER
Call was transferred to  to update insurance info- info is the same plan- did not change   Patient just needs to speak with someone regarding prior auth for vibrezi med

## 2021-01-12 NOTE — TELEPHONE ENCOUNTER
Pt calling to ck on status  I noted mssgs rec'd/note to nursing to work on [place]  Pt states KK said José Antonio Aquas is the best med for him and that he tried other meds that did not work/states "this one works best"  Pt requests CB to # [phone]

## 2021-01-14 NOTE — TELEPHONE ENCOUNTER
Pt left  mssg asking if Dr Matthew Yo spoke w/ his primary? - Dr Lianet Siddiqi ph # is [phone]  Pt req CB to [phone]

## 2021-01-15 NOTE — TELEPHONE ENCOUNTER
Left  mssg for Pt that  left mssg for his primary Dr Al Damico; assure him he will be called after Dr Amilcar obrien w/ him

## 2021-01-15 NOTE — TELEPHONE ENCOUNTER
I called and left message for his PCP to call me back, please tell patient I will contact him once I speak to Dr Ambrocio Titus

## 2021-03-15 ENCOUNTER — OFFICE VISIT (OUTPATIENT)
Dept: GASTROENTEROLOGY | Facility: CLINIC | Age: 51
End: 2021-03-15
Payer: COMMERCIAL

## 2021-03-15 VITALS
HEIGHT: 70 IN | BODY MASS INDEX: 32.21 KG/M2 | WEIGHT: 225 LBS | SYSTOLIC BLOOD PRESSURE: 120 MMHG | DIASTOLIC BLOOD PRESSURE: 78 MMHG

## 2021-03-15 DIAGNOSIS — K58.0 IRRITABLE BOWEL SYNDROME WITH DIARRHEA: Primary | ICD-10-CM

## 2021-03-15 PROCEDURE — 99213 OFFICE O/P EST LOW 20 MIN: CPT | Performed by: INTERNAL MEDICINE

## 2021-03-15 RX ORDER — AMITRIPTYLINE HYDROCHLORIDE 10 MG/1
10 TABLET, FILM COATED ORAL
Qty: 30 TABLET | Refills: 5 | Status: SHIPPED | OUTPATIENT
Start: 2021-03-15 | End: 2021-04-27 | Stop reason: ALTCHOICE

## 2021-03-15 NOTE — PROGRESS NOTES
[facility] - Outpatient Follow-up Note  Augie Curran 49 y o  male MRN: [id number]  Encounter: [id number]    ASSESSMENT AND PLAN:      1  Irritable bowel syndrome with diarrhea  Stable on Viberzi and ib guard  Still has multiple loose stools daily and migratory abdominal pains  Also complains of gas and bloating after meals  We discussed treatment options including increasing Viberzi  We had previously discussed amitriptyline and he would like to try it  Will communicate with PCP in start transitioning from Cymbalta  to amitriptyline- amitriptyline (ELAVIL) 10 mg tablet; Take 1 tablet (10 mg total) by mouth daily at bedtime  Dispense: 30 tablet; Refill: 5    2  Anxiety   despite Cymbalta twice daily he still using lorazepam daily  He has an appointment next month to discuss alternate medications  Followup Appointment: Three months  ______________________________________________________________________    Chief Complaint   Patient presents with    Irritable Bowel Syndrome     follow up     HPI:   The patient returns for follow-up on IBS- D  He was last seen in the office about 3 months ago  Symptoms are generally stable  He still has multiple loose stools a day  He has migratory abdominal pains particularly at night  He denies any specific food triggers  He has bloating and flatulence after meals most days  He continues to have issues with anxiety and uses Ativan most days      Historical Information   Past Medical History:   Diagnosis Date    ADHD     Anxiety     Hyperlipidemia      Past Surgical History:   Procedure Laterality Date    COLONOSCOPY      10 year recall     Social History     Substance and Sexual Activity   Alcohol Use Not Currently     Social History     Substance and Sexual Activity   Drug Use Not on file     Social History     Tobacco Use   Smoking Status Never Smoker   Smokeless Tobacco Never Used     Family History   Problem Relation Age of Onset    Leukemia Father     Colon cancer Neg Hx     Colon polyps Neg Hx          Current Outpatient Medications:     amLODIPine-atorvastatin (CADUET) 10-40 MG per tablet    Cholecalciferol (VITAMIN D3) 50 MCG (2000 UT) capsule    DULOXETINE HCL PO    Eluxadoline (Viberzi) 75 MG TABS    LORazepam (ATIVAN) 2 mg tablet    Nutritional Supplements (IBS SUPPORT PO)    amitriptyline (ELAVIL) 10 mg tablet  No Known Allergies  Reviewed medications and allergies and updated as indicated    PHYSICAL EXAM:    Blood pressure 120/78, height 5' 10" (1 778 m), weight 102 kg (225 lb)  Body mass index is 32 28 kg/m²  General Appearance: NAD, cooperative, alert  Eyes: Anicteric, PERRLA, EOMI  ENT:  Normocephalic, atraumatic, normal mucosa  Neck:  Supple, symmetrical, trachea midline  Resp:  Clear to auscultation bilaterally; no rales, rhonchi or wheezing; respirations unlabored   CV:  S1 S2, Regular rate and rhythm; no murmur, rub, or gallop  GI:  Soft, non-tender, non-distended; normal bowel sounds; no masses, no organomegaly   Rectal: Deferred  Musculoskeletal: No cyanosis, clubbing or edema  Normal ROM  Skin:  No jaundice, rashes, or lesions   Heme/Lymph: No palpable cervical lymphadenopathy  Psych: Normal affect, good eye contact  Neuro: No gross deficits, AAOx3    Lab Results:   Lab Results   Component Value Date    WBC 5 7 03/27/2020    HGB 14 1 03/27/2020    HCT 42 5 03/27/2020    MCV 88 03/27/2020     03/27/2020     Lab Results   Component Value Date    K 4 3 03/27/2020    CL 98 03/27/2020    CO2 28 03/27/2020    BUN 15 03/27/2020    CREATININE 0 96 03/27/2020    AST 16 03/27/2020    ALT 14 03/27/2020     No results found for: IRON, TIBC, FERRITIN  No results found for: LIPASE    Radiology Results:   No results found

## 2021-03-16 ENCOUNTER — TELEPHONE (OUTPATIENT)
Dept: GASTROENTEROLOGY | Facility: CLINIC | Age: 51
End: 2021-03-16

## 2021-03-16 NOTE — TELEPHONE ENCOUNTER
Pharmacist called and stated that Dr Jamilah Zuniga ordered Amitriptyline and there is a slight interaction with other meds  Is it okay to fill?

## 2021-03-16 NOTE — TELEPHONE ENCOUNTER
Pt called to f/u on portal mssg; wanted to make sure it would be forwarded to Shanti  Conf'd receipt of his mssg/noted forwarded to Shanti

## 2021-03-30 DIAGNOSIS — Z23 ENCOUNTER FOR IMMUNIZATION: ICD-10-CM

## 2021-04-26 ENCOUNTER — PATIENT MESSAGE (OUTPATIENT)
Dept: GASTROENTEROLOGY | Facility: CLINIC | Age: 51
End: 2021-04-26

## 2021-04-26 DIAGNOSIS — K58.0 IRRITABLE BOWEL SYNDROME WITH DIARRHEA: Primary | ICD-10-CM

## 2021-04-27 ENCOUNTER — TELEPHONE (OUTPATIENT)
Dept: GASTROENTEROLOGY | Facility: CLINIC | Age: 51
End: 2021-04-27

## 2021-04-27 RX ORDER — AMITRIPTYLINE HYDROCHLORIDE 10 MG/1
20 TABLET, FILM COATED ORAL
Qty: 60 TABLET | Refills: 5 | Status: SHIPPED | OUTPATIENT
Start: 2021-04-27 | End: 2021-06-21 | Stop reason: ALTCHOICE

## 2021-04-27 NOTE — TELEPHONE ENCOUNTER
Blanca Pollard 4/26/2021 3:56 PM EDT      ----- Message -----  From: Elizabeth Bartholomew  Sent: 4/26/2021 3:25 PM EDT  To: , *  Subject: Prescription Question     Dr Leila Ford,    I just had my physical with Dr Putnam Patient  He reduced my Duloxetine HCI to 30 MG in the morning  I told him you tried to get in touch with him  He was find with you increasing my dosage currently 10 MG to 20 MG Amitriptyline HCI  Can you please call 20 MG Amitriptyline HCI to my [facility]  I also will include my lab results  Thank you      Velma Martinez

## 2021-04-27 NOTE — TELEPHONE ENCOUNTER
Pt left  mssg stating he sent a portal mssg/needs to know if KK saw it?; needs to change med  CB# [phone]  Note - Did not note yesterday, but Pt had called asking how to upload labs from [facility] to us through portal   I did not know how to direct him; checked w/ MA, who was going to retrieve lab results  Pt advised

## 2021-05-20 DIAGNOSIS — K58.0 IRRITABLE BOWEL SYNDROME WITH DIARRHEA: ICD-10-CM

## 2021-05-20 RX ORDER — ELUXADOLINE 75 MG/1
TABLET, FILM COATED ORAL
Qty: 60 TABLET | Refills: 5 | Status: SHIPPED | OUTPATIENT
Start: 2021-05-20 | End: 2022-01-03

## 2021-06-21 ENCOUNTER — OFFICE VISIT (OUTPATIENT)
Dept: GASTROENTEROLOGY | Facility: CLINIC | Age: 51
End: 2021-06-21
Payer: COMMERCIAL

## 2021-06-21 VITALS
HEIGHT: 70 IN | WEIGHT: 235 LBS | HEART RATE: 97 BPM | DIASTOLIC BLOOD PRESSURE: 76 MMHG | BODY MASS INDEX: 33.64 KG/M2 | SYSTOLIC BLOOD PRESSURE: 112 MMHG

## 2021-06-21 DIAGNOSIS — K58.0 IRRITABLE BOWEL SYNDROME WITH DIARRHEA: Primary | ICD-10-CM

## 2021-06-21 PROCEDURE — 99214 OFFICE O/P EST MOD 30 MIN: CPT | Performed by: INTERNAL MEDICINE

## 2021-06-21 RX ORDER — MONTELUKAST SODIUM 10 MG/1
10 TABLET ORAL EVERY 24 HOURS
COMMUNITY
Start: 2021-06-09

## 2021-06-21 RX ORDER — ROSUVASTATIN CALCIUM 5 MG/1
5 TABLET, COATED ORAL EVERY 24 HOURS
COMMUNITY
Start: 2021-04-26 | End: 2022-05-17 | Stop reason: ALTCHOICE

## 2021-06-21 RX ORDER — AMITRIPTYLINE HYDROCHLORIDE 25 MG/1
25 TABLET, FILM COATED ORAL
Qty: 90 TABLET | Refills: 3 | Status: SHIPPED | OUTPATIENT
Start: 2021-06-21 | End: 2021-12-21

## 2021-06-21 NOTE — PROGRESS NOTES
[facility] - Outpatient Follow-up Note  Richy Buitrago 49 y o  male MRN: [id number]  Encounter: [id number]    ASSESSMENT AND PLAN:      1  Irritable bowel syndrome with diarrhea  Stable on Viberzi and amitriptyline  Recently weaned off duloxetine  Will continue amitriptyline 25 milligrams at night  Add Benefiber daily  Has had some increasing symptoms as he liberalizes his diet, will pay attention to food triggers      Followup Appointment:  3 months  ______________________________________________________________________    Chief Complaint   Patient presents with    Irritable Bowel Syndrome     follow up     HPI:  The patient presents for follow-up on IBS-D  He was last seen in the office in March  He increased the amitriptyline to 20 milligrams, and about 3 days ago he added a 3rd pill at bedtime  He is no longer taking duloxetine  He takes Viberzi daily, occasionally he notices loose stool  He still has some migratory pain on both flanks  He has been liberalizing his diet which he feels is causing some of his GI flare-ups  He denies any nausea or vomiting  His appetite is good      Historical Information   Past Medical History:   Diagnosis Date    ADHD     Anxiety     Hyperlipidemia     IBS (irritable bowel syndrome)      Past Surgical History:   Procedure Laterality Date    COLONOSCOPY      10 year recall     Social History     Substance and Sexual Activity   Alcohol Use Not Currently     Social History     Substance and Sexual Activity   Drug Use Not on file     Social History     Tobacco Use   Smoking Status Never Smoker   Smokeless Tobacco Never Used     Family History   Problem Relation Age of Onset    Leukemia Father     Colon cancer Neg Hx     Colon polyps Neg Hx          Current Outpatient Medications:     amitriptyline (ELAVIL) 10 mg tablet    amLODIPine-atorvastatin (CADUET) 10-40 MG per tablet    Cholecalciferol (VITAMIN D3) 50 MCG (2000 UT) capsule    LORazepam (ATIVAN) 2 mg tablet    montelukast (SINGULAIR) 10 mg tablet    rosuvastatin (CRESTOR) 5 mg tablet    Viberzi 75 MG TABS    Nutritional Supplements (IBS SUPPORT PO)  No Known Allergies  Reviewed medications and allergies and updated as indicated    PHYSICAL EXAM:    Blood pressure 112/76, pulse 97, height 5' 10" (1 778 m), weight 107 kg (235 lb)  Body mass index is 33 72 kg/m²  General Appearance: NAD, cooperative, alert  Eyes: Anicteric, PERRLA, EOMI  ENT:  Normocephalic, atraumatic, normal mucosa  Neck:  Supple, symmetrical, trachea midline  Resp:  Clear to auscultation bilaterally; no rales, rhonchi or wheezing; respirations unlabored   CV:  S1 S2, Regular rate and rhythm; no murmur, rub, or gallop  GI:  Soft, non-tender, non-distended; normal bowel sounds; no masses, no organomegaly   Rectal: Deferred  Musculoskeletal: No cyanosis, clubbing or edema  Normal ROM  Skin:  No jaundice, rashes, or lesions   Heme/Lymph: No palpable cervical lymphadenopathy  Psych: Normal affect, good eye contact  Neuro: No gross deficits, AAOx3    Lab Results:   Lab Results   Component Value Date    WBC 5 7 03/27/2020    HGB 14 1 03/27/2020    HCT 42 5 03/27/2020    MCV 88 03/27/2020     03/27/2020     Lab Results   Component Value Date    K 4 3 03/27/2020    CL 98 03/27/2020    CO2 28 03/27/2020    BUN 15 03/27/2020    CREATININE 0 96 03/27/2020    AST 16 03/27/2020    ALT 14 03/27/2020     No results found for: IRON, TIBC, FERRITIN  No results found for: LIPASE    Radiology Results:   No results found

## 2021-09-14 ENCOUNTER — OFFICE VISIT (OUTPATIENT)
Dept: GASTROENTEROLOGY | Facility: CLINIC | Age: 51
End: 2021-09-14
Payer: COMMERCIAL

## 2021-09-14 VITALS
WEIGHT: 237 LBS | HEIGHT: 70 IN | BODY MASS INDEX: 33.93 KG/M2 | HEART RATE: 98 BPM | DIASTOLIC BLOOD PRESSURE: 78 MMHG | SYSTOLIC BLOOD PRESSURE: 120 MMHG

## 2021-09-14 DIAGNOSIS — K58.0 IRRITABLE BOWEL SYNDROME WITH DIARRHEA: Primary | ICD-10-CM

## 2021-09-14 PROCEDURE — 99213 OFFICE O/P EST LOW 20 MIN: CPT | Performed by: INTERNAL MEDICINE

## 2021-09-14 RX ORDER — AMITRIPTYLINE HYDROCHLORIDE 10 MG/1
10 TABLET, FILM COATED ORAL
Qty: 90 TABLET | Refills: 3 | Status: SHIPPED | OUTPATIENT
Start: 2021-09-14 | End: 2021-12-21

## 2021-09-14 NOTE — PROGRESS NOTES
[facility] - Outpatient Follow-up Note  Sarah Duran 49 y o  male MRN: [id number]  Encounter: [id number]    ASSESSMENT AND PLAN:         1  Irritable bowel syndrome with diarrhea  Stable on Viberzi and amitriptyline  Previously took amitriptyline after dinner, Some improvement with moving amitriptyline later in the evening  Since he feels amitriptyline is improving will increase the dose, initially to 35 mg titrating up to 50 mg if needed  Has had some increasing symptoms as he liberalizes his diet, will pay attention to food triggers      Followup Appointment:  3 months  ______________________________________________________________________    Chief Complaint   Patient presents with    Follow up IBS-D     HPI:  The patient returns for follow-up on IBS-D  Symptoms are stable on amitriptyline 25 mg at bedtime and Viberzi 75 mg once daily  He still has issues with bloating with some radiation to the back  He has no nausea or vomiting  He continues to liberalize his diet but denies any specific food triggers        Historical Information   Past Medical History:   Diagnosis Date    ADHD     Anxiety     Hyperlipidemia     IBS (irritable bowel syndrome)      Past Surgical History:   Procedure Laterality Date    COLONOSCOPY      10 year recall     Social History     Substance and Sexual Activity   Alcohol Use Not Currently     Social History     Substance and Sexual Activity   Drug Use Not on file     Social History     Tobacco Use   Smoking Status Never Smoker   Smokeless Tobacco Never Used     Family History   Problem Relation Age of Onset    Leukemia Father     Colon cancer Neg Hx     Colon polyps Neg Hx          Current Outpatient Medications:     amitriptyline (ELAVIL) 25 mg tablet    amLODIPine-atorvastatin (CADUET) 10-40 MG per tablet    Cholecalciferol (VITAMIN D3) 50 MCG (2000 UT) capsule    LORazepam (ATIVAN) 2 mg tablet    montelukast (SINGULAIR) 10 mg tablet    rosuvastatin (CRESTOR) 5 mg tablet    Viberzi 75 MG TABS    Nutritional Supplements (IBS SUPPORT PO)  No Known Allergies  Reviewed medications and allergies and updated as indicated    PHYSICAL EXAM:    Blood pressure 120/78, pulse 98, height 5' 10" (1 778 m), weight 108 kg (237 lb)  Body mass index is 34 01 kg/m²  General Appearance: NAD, cooperative, alert  Eyes: Anicteric, PERRLA, EOMI  ENT:  Normocephalic, atraumatic, normal mucosa  Neck:  Supple, symmetrical, trachea midline  Resp:  Clear to auscultation bilaterally; no rales, rhonchi or wheezing; respirations unlabored   CV:  S1 S2, Regular rate and rhythm; no murmur, rub, or gallop  GI:  Soft, non-tender, non-distended; normal bowel sounds; no masses, no organomegaly   Rectal: Deferred  Musculoskeletal: No cyanosis, clubbing or edema  Normal ROM  Skin:  No jaundice, rashes, or lesions   Heme/Lymph: No palpable cervical lymphadenopathy  Psych: Normal affect, good eye contact  Neuro: No gross deficits, AAOx3    Lab Results:   Lab Results   Component Value Date    WBC 5 7 03/27/2020    HGB 14 1 03/27/2020    HCT 42 5 03/27/2020    MCV 88 03/27/2020     03/27/2020     Lab Results   Component Value Date    K 4 3 03/27/2020    CL 98 03/27/2020    CO2 28 03/27/2020    BUN 15 03/27/2020    CREATININE 0 96 03/27/2020    AST 16 03/27/2020    ALT 14 03/27/2020     No results found for: IRON, TIBC, FERRITIN  No results found for: LIPASE    Radiology Results:   No results found

## 2021-12-21 ENCOUNTER — OFFICE VISIT (OUTPATIENT)
Dept: GASTROENTEROLOGY | Facility: CLINIC | Age: 51
End: 2021-12-21
Payer: COMMERCIAL

## 2021-12-21 VITALS
WEIGHT: 242 LBS | DIASTOLIC BLOOD PRESSURE: 82 MMHG | HEART RATE: 65 BPM | HEIGHT: 70 IN | SYSTOLIC BLOOD PRESSURE: 118 MMHG | BODY MASS INDEX: 34.65 KG/M2

## 2021-12-21 DIAGNOSIS — K58.0 IRRITABLE BOWEL SYNDROME WITH DIARRHEA: Primary | ICD-10-CM

## 2021-12-21 PROCEDURE — 99213 OFFICE O/P EST LOW 20 MIN: CPT | Performed by: INTERNAL MEDICINE

## 2021-12-21 RX ORDER — AMITRIPTYLINE HYDROCHLORIDE 10 MG/1
40 TABLET, FILM COATED ORAL
Qty: 120 TABLET | Refills: 5 | Status: SHIPPED | OUTPATIENT
Start: 2021-12-21 | End: 2022-01-13

## 2022-04-06 ENCOUNTER — TELEPHONE (OUTPATIENT)
Dept: GASTROENTEROLOGY | Facility: CLINIC | Age: 52
End: 2022-04-06

## 2022-04-06 DIAGNOSIS — K58.0 IRRITABLE BOWEL SYNDROME WITH DIARRHEA: ICD-10-CM

## 2022-04-06 NOTE — TELEPHONE ENCOUNTER
Pt called back  States he got letter from insurance about needing prior auth because not covered after 7/1/2022  He threw the letter away  I suggested waiting until closer to July 1 since it was technically already approved until year 2039 but he wants PA done  Pt has appt with Dr Mic Phan in May 2022  Pt was not sure if needed refill

## 2022-04-06 NOTE — TELEPHONE ENCOUNTER
Attempted PA and response from [employer] -    This medication or product was previously approved on [id number] from 2021-01-12 to 2022-06-30  **Please note: This request was submitted electronically  Formulary lowering, tiering exception, cost reduction and/or pre-benefit determination review (including prospective Medicare hospice reviews) requests cannot be requested using this method of submission  Providers contact us at [phone] for further assistance  Pt advised  Will postpone message to beginning of June so we can revisit attempted PA for patient

## 2022-04-06 NOTE — TELEPHONE ENCOUNTER
Spoke with pharmacy  Pt has [facility] ID [id number]  Corrinne Ambrosia [id number]  n [id number]  Phone [phone]    Message left for patient to confirm need for PA? According to our records med is approved until 2039

## 2022-04-07 RX ORDER — ELUXADOLINE 75 MG/1
1 TABLET, FILM COATED ORAL 2 TIMES DAILY
Qty: 60 TABLET | Refills: 5 | Status: SHIPPED | OUTPATIENT
Start: 2022-04-07 | End: 2022-05-17 | Stop reason: SDUPTHER

## 2022-05-17 ENCOUNTER — OFFICE VISIT (OUTPATIENT)
Dept: GASTROENTEROLOGY | Facility: CLINIC | Age: 52
End: 2022-05-17
Payer: COMMERCIAL

## 2022-05-17 VITALS
DIASTOLIC BLOOD PRESSURE: 82 MMHG | WEIGHT: 233 LBS | HEART RATE: 75 BPM | HEIGHT: 70 IN | SYSTOLIC BLOOD PRESSURE: 130 MMHG | BODY MASS INDEX: 33.36 KG/M2

## 2022-05-17 DIAGNOSIS — K58.0 IRRITABLE BOWEL SYNDROME WITH DIARRHEA: ICD-10-CM

## 2022-05-17 PROCEDURE — 99213 OFFICE O/P EST LOW 20 MIN: CPT | Performed by: INTERNAL MEDICINE

## 2022-05-17 RX ORDER — AMITRIPTYLINE HYDROCHLORIDE 10 MG/1
40 TABLET, FILM COATED ORAL
Qty: 360 TABLET | Refills: 3 | Status: SHIPPED | OUTPATIENT
Start: 2022-05-17 | End: 2022-08-03

## 2022-05-17 RX ORDER — FLUTICASONE FUROATE 27.5 UG/1
SPRAY, METERED NASAL AS NEEDED
COMMUNITY
Start: 2022-04-01

## 2022-05-17 RX ORDER — FEXOFENADINE HCL 180 MG/1
180 TABLET ORAL DAILY
COMMUNITY

## 2022-05-17 RX ORDER — ELUXADOLINE 75 MG/1
1 TABLET, FILM COATED ORAL 2 TIMES DAILY
Qty: 60 TABLET | Refills: 5 | Status: SHIPPED | OUTPATIENT
Start: 2022-05-17

## 2022-05-17 RX ORDER — ROSUVASTATIN CALCIUM 10 MG/1
10 TABLET, COATED ORAL DAILY
COMMUNITY
Start: 2022-05-16

## 2022-05-17 RX ORDER — AMOXICILLIN 500 MG
1200 CAPSULE ORAL
COMMUNITY

## 2022-05-17 NOTE — PROGRESS NOTES
[facility] - Outpatient Follow-up Note  Scot Reach 47 y o  male MRN: [id number]  Encounter: [id number]    ASSESSMENT AND PLAN:      1  Irritable bowel syndrome with diarrhea  Stable   With gradual improvement on Viberzi and amitriptyline  Still has some migratory abdominal discomfort but there are no food triggers  Stools are still soft, but he wants to hold off on Questran or fiber  Will keep monitoring for food triggers  2   Colon cancer screening   negative colonoscopy January 2020, 10 year recall        Followup Appointment:  6 months  ______________________________________________________________________    Chief Complaint   Patient presents with    Follow-up     Annual follwup; IBS; medication refills     HPI:   The patient presents for follow-up on IBS - D  He was last seen in the office in December  Symptoms have improved to some agree since then  He currently takes Viberzi once daily and amitriptyline 10 mg in the morning and 30 mg at bedtime  Stools are generally well controlled with no urgent stools but stools are still soft  There are no specific food triggers  He has migratory abdominal discomfort that is not related to any particular food triggers  He denies any rectal bleeding, weight loss or other alarm symptoms  He had COVID a few weeks ago with headache and voice complaints and has recovered      Historical Information   Past Medical History:   Diagnosis Date    ADHD     Anxiety     Hyperlipidemia     IBS (irritable bowel syndrome)      Past Surgical History:   Procedure Laterality Date    COLONOSCOPY      10 year recall     Social History     Substance and Sexual Activity   Alcohol Use Not Currently     Social History     Substance and Sexual Activity   Drug Use Never     Social History     Tobacco Use   Smoking Status Never Smoker   Smokeless Tobacco Never Used     Family History   Problem Relation Age of Onset    Leukemia Father  Colon cancer Neg Hx     Colon polyps Neg Hx          Current Outpatient Medications:     amitriptyline (ELAVIL) 10 mg tablet    amLODIPine-atorvastatin (CADUET) 10-40 MG per tablet    Cholecalciferol (VITAMIN D3) 50 MCG (2000 UT) capsule    Eluxadoline (Viberzi) 75 MG TABS    fexofenadine (ALLEGRA) 180 MG tablet    fluticasone (Flonase Sensimist) 27 5 MCG/SPRAY nasal spray    LORazepam (ATIVAN) 2 mg tablet    Misc Natural Products (OSTEO BI-FLEX TRIPLE STRENGTH PO)    montelukast (SINGULAIR) 10 mg tablet    Omega-3 Fatty Acids (Fish Oil) 1200 MG CAPS    rosuvastatin (CRESTOR) 10 MG tablet    Nutritional Supplements (IBS SUPPORT PO)  No Known Allergies  Reviewed medications and allergies and updated as indicated    PHYSICAL EXAM:    Blood pressure 130/82, pulse 75, height 5' 10" (1 778 m), weight 106 kg (233 lb)  Body mass index is 33 43 kg/m²  General Appearance: NAD, cooperative, alert  Eyes: Anicteric, PERRLA, EOMI  ENT:  Normocephalic, atraumatic, normal mucosa  Neck:  Supple, symmetrical, trachea midline  Resp:  Clear to auscultation bilaterally; no rales, rhonchi or wheezing; respirations unlabored   CV:  S1 S2, Regular rate and rhythm; no murmur, rub, or gallop  GI:  Soft, non-tender, non-distended; normal bowel sounds; no masses, no organomegaly   Rectal: Deferred  Musculoskeletal: No cyanosis, clubbing or edema  Normal ROM    Skin:  No jaundice, rashes, or lesions   Heme/Lymph: No palpable cervical lymphadenopathy  Psych: Normal affect, good eye contact  Neuro: No gross deficits, AAOx3    Lab Results:   Lab Results   Component Value Date    WBC 5 7 03/27/2020    HGB 14 1 03/27/2020    HCT 42 5 03/27/2020    MCV 88 03/27/2020     03/27/2020     Lab Results   Component Value Date    K 4 3 03/27/2020    CL 98 03/27/2020    CO2 28 03/27/2020    BUN 15 03/27/2020    CREATININE 0 96 03/27/2020    AST 16 03/27/2020    ALT 14 03/27/2020     No results found for: IRON, TIBC, FERRITIN  No results found for: LIPASE    Radiology Results:   No results found

## 2022-06-22 NOTE — TELEPHONE ENCOUNTER
Date: 2022 To: Gio Ba From: Pharmacy Services Phone: [phone] Phone: [phone] Fax: [phone] Fax: [phone] Reference #: [id number] Request Date: 2022 RE: Prior Authorization Request Patient Name: Milton Tom Patient : 1970 Patient ID: [id number] Status of Request:Approval Medication Name: [place] Tab 75mg [facility]: [id number] Decision Notes: APPROVAL INFORMATION We have approved your prior authorization request for VIBERZI TAB 75MG because you meet the criteria for this medication  Per your health plan, the current approval for this drug expires on 2024  [employer] message sent to patient to update

## 2022-11-03 ENCOUNTER — OFFICE VISIT (OUTPATIENT)
Dept: GASTROENTEROLOGY | Facility: CLINIC | Age: 52
End: 2022-11-03

## 2022-11-03 VITALS
DIASTOLIC BLOOD PRESSURE: 82 MMHG | SYSTOLIC BLOOD PRESSURE: 148 MMHG | WEIGHT: 244 LBS | BODY MASS INDEX: 34.93 KG/M2 | HEIGHT: 70 IN

## 2022-11-03 DIAGNOSIS — L29.0 PRURITUS ANI: ICD-10-CM

## 2022-11-03 DIAGNOSIS — K58.0 IRRITABLE BOWEL SYNDROME WITH DIARRHEA: Primary | ICD-10-CM

## 2022-11-03 RX ORDER — AMITRIPTYLINE HYDROCHLORIDE 10 MG/1
TABLET, FILM COATED ORAL
Qty: 450 TABLET | Refills: 3 | Status: SHIPPED | OUTPATIENT
Start: 2022-11-03

## 2022-11-03 RX ORDER — CLOTRIMAZOLE AND BETAMETHASONE DIPROPIONATE 10; .64 MG/G; MG/G
CREAM TOPICAL 2 TIMES DAILY
Qty: 30 G | Refills: 0 | Status: SHIPPED | OUTPATIENT
Start: 2022-11-03

## 2022-11-03 RX ORDER — ELUXADOLINE 75 MG/1
1 TABLET, FILM COATED ORAL 2 TIMES DAILY
Qty: 60 TABLET | Refills: 5 | Status: SHIPPED | OUTPATIENT
Start: 2022-11-03

## 2022-11-03 NOTE — PROGRESS NOTES
[facility] - Outpatient Follow-up Note  Elio Mckeon 47 y o  male MRN: [id number]  Encounter: [id number]    ASSESSMENT AND PLAN:      1  Irritable bowel syndrome with diarrhea  Fair control with amitriptyline and Viberzi  He takes a full dose of Viberzi in the morning and half in the afternoon, will increase to full dose twice daily and monitor symptoms    2  Pruritus ani  Progressive complaint  External irritation on rectal exam   No relief with hydrocortisone in the past   Will try 1 week of Lotrisone twice daily and he will contact me with update on symptoms    3  Colon cancer screening   negative colonoscopy January 2020, 10 year recall      Followup Appointment:  6 months  ______________________________________________________________________    Chief Complaint   Patient presents with   • Follow-up     HPI:  The patient presents for follow-up on IBS-D  He was last seen in the office in May  Symptoms are stable since then on amitriptyline and Viberzi  He is taking half tab in the afternoon and asked about increasing to b i d  He has fiber 6 stools of variable consistency throughout the day  There are no specific food triggers  He could do well with a food 1 day and not the next  There are no upper GI complaints  He still has perirectal itching      Historical Information   Past Medical History:   Diagnosis Date   • ADHD    • Anxiety    • Hyperlipidemia    • IBS (irritable bowel syndrome)      Past Surgical History:   Procedure Laterality Date   • COLONOSCOPY      10 year recall     Social History     Substance and Sexual Activity   Alcohol Use Not Currently     Social History     Substance and Sexual Activity   Drug Use Never     Social History     Tobacco Use   Smoking Status Never Smoker   Smokeless Tobacco Never Used     Family History   Problem Relation Age of Onset   • Leukemia Father    • Colon cancer Neg Hx    • Colon polyps Neg Hx          Current Outpatient Medications:   •  amitriptyline (ELAVIL) 10 mg tablet  •  amLODIPine-atorvastatin (CADUET) 10-40 MG per tablet  •  Cholecalciferol (VITAMIN D3) 50 MCG (2000 UT) capsule  •  clotrimazole-betamethasone (LOTRISONE) 1-0 05 % cream  •  Eluxadoline (Viberzi) 75 MG TABS  •  fexofenadine (ALLEGRA) 180 MG tablet  •  fluticasone (Flonase Sensimist) 27 5 MCG/SPRAY nasal spray  •  LORazepam (ATIVAN) 2 mg tablet  •  Misc Natural Products (OSTEO BI-FLEX TRIPLE STRENGTH PO)  •  montelukast (SINGULAIR) 10 mg tablet  •  Omega-3 Fatty Acids (Fish Oil) 1200 MG CAPS  •  rosuvastatin (CRESTOR) 10 MG tablet  •  Nutritional Supplements (IBS SUPPORT PO)  No Known Allergies  Reviewed medications and allergies and updated as indicated    PHYSICAL EXAM:    Blood pressure 148/82, height 5' 10" (1 778 m), weight 111 kg (244 lb)  Body mass index is 35 01 kg/m²  General Appearance: NAD, cooperative, alert  Eyes: Anicteric, PERRLA, EOMI  ENT:  Normocephalic, atraumatic, normal mucosa  Neck:  Supple, symmetrical, trachea midline  Resp:  Clear to auscultation bilaterally; no rales, rhonchi or wheezing; respirations unlabored   CV:  S1 S2, Regular rate and rhythm; no murmur, rub, or gallop  GI:  Soft, non-tender, non-distended; normal bowel sounds; no masses, no organomegaly   Rectal: Deferred  Musculoskeletal: No cyanosis, clubbing or edema  Normal ROM    Skin:  No jaundice, rashes, or lesions   Heme/Lymph: No palpable cervical lymphadenopathy  Psych: Normal affect, good eye contact  Neuro: No gross deficits, AAOx3    Lab Results:   Lab Results   Component Value Date    WBC 5 7 03/27/2020    HGB 14 1 03/27/2020    HCT 42 5 03/27/2020    MCV 88 03/27/2020     03/27/2020     Lab Results   Component Value Date    K 4 3 03/27/2020    CL 98 03/27/2020    CO2 28 03/27/2020    BUN 15 03/27/2020    CREATININE 0 96 03/27/2020    AST 16 03/27/2020    ALT 14 03/27/2020     No results found for: IRON, TIBC, FERRITIN  No results found for: LIPASE    Radiology Results:   No results found

## 2023-06-16 ENCOUNTER — OFFICE VISIT (OUTPATIENT)
Dept: GASTROENTEROLOGY | Facility: CLINIC | Age: 53
End: 2023-06-16
Payer: COMMERCIAL

## 2023-06-16 VITALS
SYSTOLIC BLOOD PRESSURE: 138 MMHG | BODY MASS INDEX: 35.79 KG/M2 | WEIGHT: 250 LBS | HEIGHT: 70 IN | DIASTOLIC BLOOD PRESSURE: 80 MMHG

## 2023-06-16 DIAGNOSIS — K58.0 IRRITABLE BOWEL SYNDROME WITH DIARRHEA: ICD-10-CM

## 2023-06-16 PROCEDURE — 99213 OFFICE O/P EST LOW 20 MIN: CPT | Performed by: INTERNAL MEDICINE

## 2023-06-16 RX ORDER — AMITRIPTYLINE HYDROCHLORIDE 25 MG/1
25 TABLET, FILM COATED ORAL
Qty: 30 TABLET | Refills: 3 | Status: SHIPPED | OUTPATIENT
Start: 2023-06-16

## 2023-06-16 RX ORDER — SACCHAROMYCES BOULARDII 250 MG
250 CAPSULE ORAL 2 TIMES DAILY
COMMUNITY

## 2023-06-16 RX ORDER — ELUXADOLINE 75 MG/1
1 TABLET, FILM COATED ORAL 2 TIMES DAILY
Qty: 60 TABLET | Refills: 5 | Status: SHIPPED | OUTPATIENT
Start: 2023-06-16

## 2023-06-16 RX ORDER — AMITRIPTYLINE HYDROCHLORIDE 10 MG/1
TABLET, FILM COATED ORAL
Qty: 450 TABLET | Refills: 3 | Status: SHIPPED | OUTPATIENT
Start: 2023-06-16

## 2023-06-16 NOTE — PROGRESS NOTES
[facility] - Outpatient Follow-up Note  Ilana Herrera 47 y o  male MRN: [id number]  Encounter: [id number]    ASSESSMENT AND PLAN:      1  Irritable bowel syndrome with diarrhea  Fair control with Viberzi and amitriptyline  Takes amitriptyline as a split dose  He is working with his PCP and neurologist on anxiety/OCD, Zoloft has been discussed  This may provide good relief of GI symptoms as well  He will consider transitioning from amitriptyline to Zoloft     2  Pruritus ani  Progressive complaint  Prescribed Lotrisone at last visit but he has not started it yet     3   Colon cancer screening   negative colonoscopy January 2020, 10 year recall    Followup Appointment: 6 months  ______________________________________________________________________    Chief Complaint   Patient presents with   • Follow-up     IBS     HPI: The patient presents for follow-up on IBS-D  Symptoms are stable or slightly improved on amitriptyline and Viberzi  He denies any medication side effects other than dry mouth from amitriptyline  He denies any specific food triggers  His appetite is good  He has gained about 6 pounds since his last visit  He has good days and bad days  He definitely feels symptoms are exacerbated by stress      Historical Information   Past Medical History:   Diagnosis Date   • ADHD    • Anxiety    • Hyperlipidemia    • IBS (irritable bowel syndrome)      Past Surgical History:   Procedure Laterality Date   • COLONOSCOPY      10 year recall     Social History     Substance and Sexual Activity   Alcohol Use Not Currently     Social History     Substance and Sexual Activity   Drug Use Never     Social History     Tobacco Use   Smoking Status Never   Smokeless Tobacco Never     Family History   Problem Relation Age of Onset   • Leukemia Father    • Colon cancer Neg Hx    • Colon polyps Neg Hx          Current Outpatient Medications:   •  amitriptyline (ELAVIL) 10 mg "tablet  •  amitriptyline (ELAVIL) 25 mg tablet  •  amLODIPine-atorvastatin (CADUET) 10-40 MG per tablet  •  Cholecalciferol (VITAMIN D3) 50 MCG (2000 UT) capsule  •  Eluxadoline (Viberzi) 75 MG TABS  •  fexofenadine (ALLEGRA) 180 MG tablet  •  fluticasone (Flonase Sensimist) 27 5 MCG/SPRAY nasal spray  •  LORazepam (ATIVAN) 2 mg tablet  •  Misc Natural Products (OSTEO BI-FLEX TRIPLE STRENGTH PO)  •  montelukast (SINGULAIR) 10 mg tablet  •  Omega-3 Fatty Acids (Fish Oil) 1200 MG CAPS  •  rosuvastatin (CRESTOR) 10 MG tablet  •  saccharomyces boulardii (FLORASTOR) 250 mg capsule  No Known Allergies  Reviewed medications and allergies and updated as indicated    PHYSICAL EXAM:    Blood pressure 138/80, height 5' 10\" (1 778 m), weight 113 kg (250 lb)  Body mass index is 35 87 kg/m²  General Appearance: NAD, cooperative, alert  Eyes: Anicteric, PERRLA, EOMI  ENT:  Normocephalic, atraumatic, normal mucosa  Neck:  Supple, symmetrical, trachea midline  Resp:  Clear to auscultation bilaterally; no rales, rhonchi or wheezing; respirations unlabored   CV:  S1 S2, Regular rate and rhythm; no murmur, rub, or gallop  GI:  Soft, non-tender, non-distended; normal bowel sounds; no masses, no organomegaly   Rectal: Deferred  Musculoskeletal: No cyanosis, clubbing or edema  Normal ROM  Skin:  No jaundice, rashes, or lesions   Heme/Lymph: No palpable cervical lymphadenopathy  Psych: Normal affect, good eye contact  Neuro: No gross deficits, AAOx3    Lab Results:   Lab Results   Component Value Date    WBC 5 7 03/27/2020    HGB 14 1 03/27/2020    HCT 42 5 03/27/2020    MCV 88 03/27/2020     03/27/2020     Lab Results   Component Value Date    K 4 3 03/27/2020    CL 98 03/27/2020    CO2 28 03/27/2020    BUN 15 03/27/2020    CREATININE 0 96 03/27/2020    AST 16 03/27/2020    ALT 14 03/27/2020     No results found for: \"IRON\", \"TIBC\", \"FERRITIN\"  No results found for: \"LIPASE\"    Radiology Results:   No results found    Answers " for HPI/ROS submitted by the patient on 6/15/2023  anorexia: No  arthralgias: Yes  belching: Yes  diarrhea: Yes  flatus: Yes  frequency: Yes

## 2023-12-11 ENCOUNTER — OFFICE VISIT (OUTPATIENT)
Dept: GASTROENTEROLOGY | Facility: CLINIC | Age: 53
End: 2023-12-11
Payer: COMMERCIAL

## 2023-12-11 VITALS
WEIGHT: 255 LBS | SYSTOLIC BLOOD PRESSURE: 124 MMHG | DIASTOLIC BLOOD PRESSURE: 82 MMHG | BODY MASS INDEX: 36.51 KG/M2 | HEIGHT: 70 IN

## 2023-12-11 DIAGNOSIS — K58.0 IRRITABLE BOWEL SYNDROME WITH DIARRHEA: ICD-10-CM

## 2023-12-11 PROCEDURE — 99213 OFFICE O/P EST LOW 20 MIN: CPT | Performed by: INTERNAL MEDICINE

## 2023-12-11 RX ORDER — ELUXADOLINE 75 MG/1
1 TABLET, FILM COATED ORAL 2 TIMES DAILY
Qty: 60 TABLET | Refills: 5 | Status: SHIPPED | OUTPATIENT
Start: 2023-12-11

## 2023-12-11 RX ORDER — AMITRIPTYLINE HYDROCHLORIDE 10 MG/1
TABLET, FILM COATED ORAL
Qty: 450 TABLET | Refills: 3 | Status: SHIPPED | OUTPATIENT
Start: 2023-12-11

## 2023-12-11 NOTE — PROGRESS NOTES
[facility] - Outpatient Follow-up Note  Jeffery Godoy 47 y.o. male MRN: [id number]  Encounter: [id number]    ASSESSMENT AND PLAN:      1. Irritable bowel syndrome with diarrhea  Fair control with amitriptyline and Viberzi  He takes amitriptyline as a divided dose. He discussed transitioning to Zoloft with his neurologist, but he wants to stick with amitriptyline. 2.  Weight gain  Progressive complaint. He has liberalized his diet with better control of IBS and has gained 70 pounds since his initial GI visit December 2019  He began to discuss semaglutide with his PCP. I am in favor of a trial of this for weight loss, and we will adjust if any GI side effects develop     3. Colon cancer screening   negative colonoscopy January 2020, 10 year recall      Followup Appointment: 6 months  ______________________________________________________________________    Chief Complaint   Patient presents with    Follow up-IBS-D     HPI: The patient presents for follow-up on IBS-D. Symptoms are generally stable. He still gets bloating and gas throughout the day. If symptoms were 10 out of 10 initially, they are now a 4 out of 10. He takes amitriptyline 20 mg twice daily. He continues to take Viberzi. He admits that he is very fond of food and could eat up to 3 slice of pizza in the setting, but in the past to eat up to a full pie. [facility]   He is contemplating starting semaglutide for weight loss, and he is hopeful that with slowing GI motility it might help his diarrhea    Historical Information   Past Medical History:   Diagnosis Date    ADHD     Anxiety     Hyperlipidemia     IBS (irritable bowel syndrome)      Past Surgical History:   Procedure Laterality Date    COLONOSCOPY      10 year recall     Social History     Substance and Sexual Activity   Alcohol Use Not Currently     Social History     Substance and Sexual Activity   Drug Use Never     Social History     Tobacco Use Smoking Status Never   Smokeless Tobacco Never     Family History   Problem Relation Age of Onset    Leukemia Father     Colon cancer Neg Hx     Colon polyps Neg Hx          Current Outpatient Medications:     amitriptyline (ELAVIL) 10 mg tablet    amitriptyline (ELAVIL) 25 mg tablet    amLODIPine-atorvastatin (CADUET) 10-40 MG per tablet    Cholecalciferol (VITAMIN D3) 50 MCG (2000 UT) capsule    Eluxadoline (Viberzi) 75 MG TABS    fexofenadine (ALLEGRA) 180 MG tablet    fluticasone (Flonase Sensimist) 27.5 MCG/SPRAY nasal spray    LORazepam (ATIVAN) 2 mg tablet    Misc Natural Products (OSTEO BI-FLEX TRIPLE STRENGTH PO)    montelukast (SINGULAIR) 10 mg tablet    Omega-3 Fatty Acids (Fish Oil) 1200 MG CAPS    rosuvastatin (CRESTOR) 10 MG tablet    saccharomyces boulardii (FLORASTOR) 250 mg capsule  No Known Allergies  Reviewed medications and allergies and updated as indicated    PHYSICAL EXAM:    Blood pressure 124/82, height 5' 10" (1.778 m), weight 116 kg (255 lb). Body mass index is 36.59 kg/m². General Appearance: NAD, cooperative, alert  Eyes: Anicteric, PERRLA, EOMI  ENT:  Normocephalic, atraumatic, normal mucosa. Neck:  Supple, symmetrical, trachea midline  Resp:  Clear to auscultation bilaterally; no rales, rhonchi or wheezing; respirations unlabored   CV:  S1 S2, Regular rate and rhythm; no murmur, rub, or gallop. GI:  Soft, non-tender, non-distended; normal bowel sounds; no masses, no organomegaly   Rectal: Deferred  Musculoskeletal: No cyanosis, clubbing or edema. Normal ROM.   Skin:  No jaundice, rashes, or lesions   Heme/Lymph: No palpable cervical lymphadenopathy  Psych: Normal affect, good eye contact  Neuro: No gross deficits, AAOx3    Lab Results:   Lab Results   Component Value Date    WBC 5.7 03/27/2020    HGB 14.1 03/27/2020    HCT 42.5 03/27/2020    MCV 88 03/27/2020     03/27/2020     Lab Results   Component Value Date    K 4.3 03/27/2020    CL 98 03/27/2020    CO2 28 03/27/2020 BUN 15 03/27/2020    CREATININE 0.96 03/27/2020    AST 16 03/27/2020    ALT 14 03/27/2020     No results found for: "IRON", "TIBC", "FERRITIN"  No results found for: "LIPASE"    Radiology Results:   No results found.

## 2024-01-02 ENCOUNTER — TELEPHONE (OUTPATIENT)
Age: 54
End: 2024-01-02

## 2024-01-02 NOTE — TELEPHONE ENCOUNTER
PA brittanie Walsh    Submitted via  [x]CMM-KEY [id number]  []Surescripts-Case ID #    []Faxed to plan   []Other website    []Phone call Case ID #      Office notes sent, clinical questions answered. Awaiting determination

## 2024-01-02 NOTE — TELEPHONE ENCOUNTER
Patient has new [employer] insurance by could not provide information while on the phone. [employer] insurance requires auth for this medication. Please review.     Reason for call:   [x] Prior Auth  [] Other:     Caller:  [x] Patient  [] Pharmacy  Name: [facility]   Address: [address] [place] [address]   Callback Number: [phone]    Medication: Viberzi 75 mg , take 1 tablet by mouth 2 times a day      Ordering Provider:   [] PCP/Provider -   [x] Speciality/Provider - GI

## 2024-01-03 ENCOUNTER — TELEPHONE (OUTPATIENT)
Age: 54
End: 2024-01-03

## 2024-01-03 NOTE — TELEPHONE ENCOUNTER
PA for Vibrezi Denied    Reason:(Screenshot if applicable)            Message sent to clinical pool Yes    Denial letter scanned into Media Yes    Appeal started No

## 2024-01-03 NOTE — TELEPHONE ENCOUNTER
Sent a teams message to Katalina Kirk to see if there is a way to view what was submitted to insurance.

## 2024-01-03 NOTE — TELEPHONE ENCOUNTER
Patient called the medication refill line to inquiring about the denial of his medication. He stated he does not have any of the conditions that the insurance denied it for. He is concerned because he only has enough left for a week. He would like someone from the office to call him as soon as possible to discuss what the next step is to getting this medication covered for him. [phone]

## 2024-01-03 NOTE — TELEPHONE ENCOUNTER
PA for Viberzi    Submitted via  [x]CMM-KEY [id number]  []Surescripts-Case ID #    []Faxed to plan   []Other website    []Phone call Case ID #      Office notes sent, clinical questions answered. Awaiting determination

## 2024-01-03 NOTE — TELEPHONE ENCOUNTER
PA for Viberzi Approved   Date(s) approved 1/3/24-1/3/25  Case #     Patient advised by [x] [employer] Message                      [] Phone call       Pharmacy advised by [x]Fax                                     []Phone call    Approval letter scanned into Media Yes

## 2024-01-03 NOTE — TELEPHONE ENCOUNTER
Patient called the Rx line. Insurance will be sending over another form. On [employer]'s website no other alternatives for medication. Medication is used to treat irritable bowel with diarrhea. The second part on the from is all no per patient. Does not have alcoholism, alcohol abuse or alcohol addiction, or a  patient who drinks more than 3 alcoholic beverages per day. Patient has been on medication for two years. Would like a phone call back. [phone] or [phone]

## 2024-01-23 NOTE — PROGRESS NOTES
Virtual Regular Visit    Problem List Items Addressed This Visit     None      Visit Diagnoses     Irritable bowel syndrome with diarrhea        Relevant Medications    Eluxadoline 75 MG TABS        1, IBS-D  significant improvement with the addition of Viberzi  He is currently taking a half tablet once daily  This is much less than the standard dosing  Will transition to 75 mg, initially once daily titrating as tolerated  Will check basic labs to assess for dehydration with his urination complaints  Reason for visit is follow-up on IBS-D    Encounter provider Donivan Goldmann, DO    Provider located at [address] [address] [phone]      Recent Visits  No visits were found meeting these conditions  Showing recent visits within past 7 days and meeting all other requirements     Future Appointments  No visits were found meeting these conditions  Showing future appointments within next 150 days and meeting all other requirements        After connecting through [employer], the patient was identified by name and date of birth  Jacinta Caldwell was informed that this is a telemedicine visit and that the visit is being conducted through [employer] which may not be secure and therefore, might not be HIPAA-compliant  My office door was closed  No one else was in the room  He acknowledged consent and understanding of privacy and security of the video platform  The patient has agreed to participate and understands they can discontinue the visit at any time  Subjective  Jacinta Caldwell is a 53 y o  male who presents for via virtual visit for follow-up on IBS-d  In February  He complained of dry mouth with dicyclomine  He was started on Viberzi 100 mg twice daily  Taking it twice daily lead to constipation  He current takes half tablet once daily    He has about 4 formed stools daily with no further diarrhea  He is much less mucus per rectum  He denies any abdominal pain  He still complains of some throat dryness  He denies any significant rectal bleeding  His main complaint today is increased urination and some nocturia  He denies any urinary bleeding  He still adheres to FODMAP diet but would like to advance his diet  Past Medical History:   Diagnosis Date    ADHD     Anxiety     Hyperlipidemia        Past Surgical History:   Procedure Laterality Date    COLONOSCOPY      10 year recall       Current Outpatient Medications   Medication Sig Dispense Refill    amLODIPine-atorvastatin (CADUET) 10-40 MG per tablet Take 1 tablet by mouth daily       Cholecalciferol (VITAMIN D3) 50 MCG (2000 UT) capsule Take 4,000 Units by mouth daily       LORazepam (ATIVAN) 2 mg tablet Take 2 mg by mouth 4 (four) times a day      Multiple Vitamins-Minerals (CENTRUM SILVER PO) Take 1 tablet by mouth daily      Turmeric 500 MG CAPS Take 1 capsule by mouth daily      aspirin (ECOTRIN LOW STRENGTH) 81 mg EC tablet Take 81 mg by mouth daily      dicyclomine (BENTYL) 10 mg capsule Take 1 capsule (10 mg total) by mouth daily 30 capsule 2    Eluxadoline 75 MG TABS Take 1 tablet (75 mg total) by mouth 2 (two) times a day 60 tablet 5     No current facility-administered medications for this visit  No Known Allergies    Review of Systems   Eyes: Positive for itching  Genitourinary: Positive for frequency  All other systems reviewed and are negative  Physical Exam   Constitutional: He is oriented to person, place, and time  He appears well-developed and well-nourished  HENT:   Head: Normocephalic  Eyes: Pupils are equal, round, and reactive to light  Neck: Normal range of motion  Pulmonary/Chest: Effort normal    Abdominal: Soft  Neurological: He is alert and oriented to person, place, and time  Skin: Skin is warm and dry  Psychiatric: He has a normal mood and affect   His behavior is normal         I spent 24 minutes with the patient during this visit  no

## 2024-02-17 DIAGNOSIS — K58.0 IRRITABLE BOWEL SYNDROME WITH DIARRHEA: ICD-10-CM

## 2024-02-19 RX ORDER — AMITRIPTYLINE HYDROCHLORIDE 10 MG/1
TABLET, FILM COATED ORAL
Qty: 450 TABLET | Refills: 1 | Status: SHIPPED | OUTPATIENT
Start: 2024-02-19

## 2024-07-25 ENCOUNTER — OFFICE VISIT (OUTPATIENT)
Dept: GASTROENTEROLOGY | Facility: CLINIC | Age: 54
End: 2024-07-25
Payer: COMMERCIAL

## 2024-07-25 VITALS
SYSTOLIC BLOOD PRESSURE: 126 MMHG | BODY MASS INDEX: 36.79 KG/M2 | WEIGHT: 257 LBS | DIASTOLIC BLOOD PRESSURE: 82 MMHG | HEIGHT: 70 IN

## 2024-07-25 DIAGNOSIS — K58.0 IRRITABLE BOWEL SYNDROME WITH DIARRHEA: Primary | ICD-10-CM

## 2024-07-25 PROCEDURE — 99214 OFFICE O/P EST MOD 30 MIN: CPT | Performed by: INTERNAL MEDICINE

## 2024-07-25 RX ORDER — AMLODIPINE AND VALSARTAN 5; 160 MG/1; MG/1
1 TABLET ORAL DAILY
COMMUNITY

## 2024-07-25 NOTE — PROGRESS NOTES
[facility] - Outpatient Follow-up Note  nAuj Oconnell 54 y.o. male MRN: [id number]  Encounter: [id number]    ASSESSMENT AND PLAN:      1. Irritable bowel syndrome with diarrhea  Fair control with amitriptyline and Viberzi  He is only taking Viberzi once daily, we discussed optimizing this and he is willing to try twice daily dosing.  He will update me via the portal in a few weeks.  If symptoms persist we discussed a trial of an alternate probiotic or adding IBgard       2.  Weight gain  Progressive complaint.  He has liberalized his diet with better control of IBS and has gained 70 pounds since his initial GI visit December 2019  He continues to contemplate a GLP-1 agonist     3.  Colon cancer screening   negative colonoscopy January 2020, 10 year recall      Followup Appointment: 6 months  ______________________________________________________________________    Chief Complaint   Patient presents with    Follow-up     Pt has IBS, better but still not good     HPI: Patient presents for follow-up on IBS.  Symptoms are generally stable but still not optimized.  He has 4 5 stool spread out throughout the day.  They are urgent.  There is no bleeding or mucus.  There are no specific food triggers.  Since his last visit his blood pressure med was altered due to a dry cough.    Historical Information   Past Medical History:   Diagnosis Date    ADHD     Anxiety     Hyperlipidemia     IBS (irritable bowel syndrome)      Past Surgical History:   Procedure Laterality Date    COLONOSCOPY      10 year recall     Social History     Substance and Sexual Activity   Alcohol Use Not Currently     Social History     Substance and Sexual Activity   Drug Use Never     Social History     Tobacco Use   Smoking Status Never   Smokeless Tobacco Never     Family History   Problem Relation Age of Onset    Leukemia Father     Colon cancer Neg Hx     Colon polyps Neg Hx          Current Outpatient  "Medications:     amitriptyline (ELAVIL) 10 mg tablet    amLODIPine-valsartan (EXFORGE) 5-160 MG per tablet    Cholecalciferol (VITAMIN D3) 50 MCG (2000 UT) capsule    fexofenadine (ALLEGRA) 180 MG tablet    LORazepam (ATIVAN) 2 mg tablet    Misc Natural Products (OSTEO BI-FLEX TRIPLE STRENGTH PO)    montelukast (SINGULAIR) 10 mg tablet    Omega-3 Fatty Acids (Fish Oil) 1200 MG CAPS    rosuvastatin (CRESTOR) 10 MG tablet    saccharomyces boulardii (FLORASTOR) 250 mg capsule    Eluxadoline (Viberzi) 75 MG TABS    fluticasone (Flonase Sensimist) 27.5 MCG/SPRAY nasal spray  No Known Allergies  Reviewed medications and allergies and updated as indicated    PHYSICAL EXAM:    Blood pressure 126/82, height 5' 10\" (1.778 m), weight 117 kg (257 lb). Body mass index is 36.88 kg/m².  General Appearance: NAD, cooperative, alert  Eyes: Anicteric, conjunctiva pink  ENT:  Normocephalic, atraumatic, normal mucosa.    Neck:  Supple, symmetrical, trachea midline  Resp:  Clear to auscultation bilaterally; no rales, rhonchi or wheezing; respirations unlabored   CV:  S1 S2, Regular rate and rhythm; no murmur, rub, or gallop.  GI:  Soft, non-tender, non-distended; normal bowel sounds; no masses, no organomegaly   Rectal: Deferred  Musculoskeletal: No cyanosis, clubbing or edema. Normal ROM.  Skin:  No jaundice, rashes, or lesions   Heme/Lymph: No palpable cervical lymphadenopathy  Psych: Normal affect, good eye contact  Neuro: No gross deficits, AAOx3    Lab Results:   Lab Results   Component Value Date    WBC 5.7 03/27/2020    HGB 14.1 03/27/2020    HCT 42.5 03/27/2020    MCV 88 03/27/2020     03/27/2020     Lab Results   Component Value Date    K 4.3 03/27/2020    CL 98 03/27/2020    CO2 28 03/27/2020    BUN 15 03/27/2020    CREATININE 0.96 03/27/2020    AST 16 03/27/2020    ALT 14 03/27/2020       Radiology Results:   No results found.    "

## 2024-07-25 NOTE — PATIENT INSTRUCTIONS
I recommend increasing the Viberzi to 75 mg twice daily.  Keep the other meds including amitriptyline and the Florastor at a steady dose.    If symptoms improved significantly we can start to relax other medications.  If your symptoms remain bothersome I recommend adding IBgard once daily.    In the future we also discussed transitioning from Florastor to an alternate probiotic like align or Culturelle.  Please update me before making any major changes at

## 2024-07-27 DIAGNOSIS — K58.0 IRRITABLE BOWEL SYNDROME WITH DIARRHEA: ICD-10-CM

## 2024-07-29 RX ORDER — ELUXADOLINE 75 MG/1
1 TABLET, FILM COATED ORAL 2 TIMES DAILY
Qty: 60 TABLET | Refills: 5 | Status: SHIPPED | OUTPATIENT
Start: 2024-07-29

## 2024-08-16 DIAGNOSIS — K58.0 IRRITABLE BOWEL SYNDROME WITH DIARRHEA: ICD-10-CM

## 2024-08-16 RX ORDER — AMITRIPTYLINE HYDROCHLORIDE 10 MG/1
TABLET ORAL
Qty: 450 TABLET | Refills: 1 | Status: SHIPPED | OUTPATIENT
Start: 2024-08-16

## 2025-01-15 ENCOUNTER — OFFICE VISIT (OUTPATIENT)
Dept: GASTROENTEROLOGY | Facility: CLINIC | Age: 55
End: 2025-01-15
Payer: COMMERCIAL

## 2025-01-15 VITALS
SYSTOLIC BLOOD PRESSURE: 141 MMHG | BODY MASS INDEX: 37.14 KG/M2 | HEIGHT: 70 IN | DIASTOLIC BLOOD PRESSURE: 79 MMHG | WEIGHT: 259.4 LBS

## 2025-01-15 DIAGNOSIS — K58.0 IRRITABLE BOWEL SYNDROME WITH DIARRHEA: ICD-10-CM

## 2025-01-15 PROCEDURE — 99214 OFFICE O/P EST MOD 30 MIN: CPT | Performed by: INTERNAL MEDICINE

## 2025-01-15 RX ORDER — AMITRIPTYLINE HYDROCHLORIDE 10 MG/1
40 TABLET ORAL
Qty: 360 TABLET | Refills: 3 | Status: SHIPPED | OUTPATIENT
Start: 2025-01-15

## 2025-01-15 RX ORDER — ELUXADOLINE 100 MG/1
100 TABLET, FILM COATED ORAL 2 TIMES DAILY
Qty: 180 TABLET | Refills: 3 | Status: SHIPPED | OUTPATIENT
Start: 2025-01-15

## 2025-01-15 NOTE — PROGRESS NOTES
[facility] - Outpatient Follow-up Note  Anuj Oconnell 54 y.o. male MRN: [id number]  Encounter: [id number]    ASSESSMENT AND PLAN:      1. Irritable bowel syndrome with diarrhea  Fair control with amitriptyline and Viberzi  He has had interval improvement with increase in the Viberzi to 75 mg once a day, he agrees with increase to 100 mg twice daily  He will contact me with any changes in symptoms.    We discussed a trial of Iberogast for symptomatic relief    He currently takes a probiotic daily, I recommended occasional holidays to see if it is still necessary.      2.  Weight gain  Progressive complaint.  Began with liberalizing diet with better IBS control.  Stable since last visit.  I congratulated him on continuing to increase his physical activity  He is contemplating a GLP 1 agonist but they are still cost prohibitive       3.  Colon cancer screening   negative colonoscopy January 2020, 10 year recall      Followup Appointment: 6 months  ______________________________________________________________________    Chief Complaint   Patient presents with    Irritable Bowel Syndrome     Needs Viberzi     HPI: The patient presents for follow-up on IBS-D.  He was last seen in the office in July.  He has noticed interval improvement in symptoms with increasing the Viberzi to 75 mg twice daily.  He has 4-5 stools a day.  They are less urgent and the consistency is better.  He denies any specific food triggers.  He denies any constipation.  There is no rectal bleeding.  He is working on increasing his physical activity.  He use the treadmill for at least a mile a day, and is doing high intensity interval training.  He continues to have intermittent flank discomfort, unclear if related to the bowel cycle.    Historical Information   Past Medical History:   Diagnosis Date    ADHD     Anxiety     Hyperlipidemia     IBS (irritable bowel syndrome)      Past Surgical History:  "  Procedure Laterality Date    COLONOSCOPY      10 year recall     Social History     Substance and Sexual Activity   Alcohol Use Not Currently     Social History     Substance and Sexual Activity   Drug Use Never     Social History     Tobacco Use   Smoking Status Never   Smokeless Tobacco Never     Family History   Problem Relation Age of Onset    Leukemia Father     Colon cancer Neg Hx     Colon polyps Neg Hx          Current Outpatient Medications:     amitriptyline (ELAVIL) 10 mg tablet    amLODIPine-valsartan (EXFORGE) 5-160 MG per tablet    Cholecalciferol (VITAMIN D3) 50 MCG (2000 UT) capsule    Eluxadoline (Viberzi) 100 MG TABS    fexofenadine (ALLEGRA) 180 MG tablet    fluticasone (Flonase Sensimist) 27.5 MCG/SPRAY nasal spray    LORazepam (ATIVAN) 2 mg tablet    Misc Natural Products (OSTEO BI-FLEX TRIPLE STRENGTH PO)    montelukast (SINGULAIR) 10 mg tablet    Omega-3 Fatty Acids (Fish Oil) 1200 MG CAPS    rosuvastatin (CRESTOR) 10 MG tablet    saccharomyces boulardii (FLORASTOR) 250 mg capsule  No Known Allergies  Reviewed medications and allergies and updated as indicated    PHYSICAL EXAM:    Blood pressure 141/79, height 5' 10\" (1.778 m), weight 118 kg (259 lb 6.4 oz). Body mass index is 37.22 kg/m².  General Appearance: NAD, cooperative, alert  Eyes: Anicteric, conjunctiva pink  ENT:  Normocephalic, atraumatic, normal mucosa.    Neck:  Supple, symmetrical, trachea midline  Resp:  Clear to auscultation bilaterally; no rales, rhonchi or wheezing; respirations unlabored   CV:  S1 S2, Regular rate and rhythm; no murmur, rub, or gallop.  GI:  Soft, non-tender, non-distended; normal bowel sounds; no masses, no organomegaly   Rectal: Deferred  Musculoskeletal: No cyanosis, clubbing or edema. Normal ROM.  Skin:  No jaundice, rashes, or lesions   Heme/Lymph: No palpable cervical lymphadenopathy  Psych: Normal affect, good eye contact  Neuro: No gross deficits, AAOx3    Lab Results:   Lab Results   Component " Value Date    WBC 5.7 03/27/2020    HGB 14.1 03/27/2020    HCT 42.5 03/27/2020    MCV 88 03/27/2020     03/27/2020     Lab Results   Component Value Date    K 4.3 03/27/2020    CL 98 03/27/2020    CO2 28 03/27/2020    BUN 15 03/27/2020    CREATININE 0.96 03/27/2020    AST 16 03/27/2020    ALT 14 03/27/2020       Radiology Results:   No results found.

## 2025-01-17 ENCOUNTER — TELEPHONE (OUTPATIENT)
Age: 55
End: 2025-01-17

## 2025-01-20 NOTE — TELEPHONE ENCOUNTER
PA for Viberzi APPROVED     Date(s) approved until 1/20/2026      Patient advised by          []MyChart Message  [x]Phone call   []LMOM  []L/M to call office as no active Communication consent on file  []Unable to leave detailed message as VM not approved on Communication consent       Pharmacy advised by    [x]Fax  []Phone call    Approval letter scanned into [employer] Yes

## 2025-01-20 NOTE — TELEPHONE ENCOUNTER
PA for Viberzi 100mg     SUBMITTED to [employer]    via    [x]CMM-KEY: [id number]    All office notes, labs and other pertaining documents and studies sent. Clinical questions answered. Awaiting determination from insurance company.     Turnaround time for your insurance to make a decision on your Prior Authorization can take 7-21 business days.

## 2025-06-05 ENCOUNTER — OFFICE VISIT (OUTPATIENT)
Dept: GASTROENTEROLOGY | Facility: CLINIC | Age: 55
End: 2025-06-05
Payer: COMMERCIAL

## 2025-06-05 VITALS
BODY MASS INDEX: 36.08 KG/M2 | SYSTOLIC BLOOD PRESSURE: 130 MMHG | HEIGHT: 70 IN | WEIGHT: 252 LBS | DIASTOLIC BLOOD PRESSURE: 78 MMHG

## 2025-06-05 DIAGNOSIS — K58.0 IRRITABLE BOWEL SYNDROME WITH DIARRHEA: Primary | ICD-10-CM

## 2025-06-05 DIAGNOSIS — R14.0 BLOATING: ICD-10-CM

## 2025-06-05 DIAGNOSIS — Z12.11 SCREENING FOR COLON CANCER: ICD-10-CM

## 2025-06-05 PROCEDURE — 99214 OFFICE O/P EST MOD 30 MIN: CPT | Performed by: INTERNAL MEDICINE

## 2025-06-05 RX ORDER — MINOXIDIL 2.5 MG/1
2.5 TABLET ORAL EVERY OTHER DAY
COMMUNITY
Start: 2025-03-25

## 2025-06-05 NOTE — ASSESSMENT & PLAN NOTE
Improvement in symptoms with increasing Viberzi to 100 mg twice daily, and continue amitriptyline.    Has a formed stool in the morning, then additional softer stool later in the day.  Unaware of any food triggers.    I recommend tapering off the probiotic while monitoring GI symptoms.  If symptoms persist I recommend a trial of Iberogast.

## 2025-06-05 NOTE — PROGRESS NOTES
"Name: Anuj Oconnell      : 1970      MRN: [id number]  Encounter Provider: Myles Little DO  Encounter Date: 2025   Encounter department: [facility]  :  Assessment & Plan  Irritable bowel syndrome with diarrhea  Improvement in symptoms with increasing Viberzi to 100 mg twice daily, and continue amitriptyline.    Has a formed stool in the morning, then additional softer stool later in the day.  Unaware of any food triggers.    I recommend tapering off the probiotic while monitoring GI symptoms.  If symptoms persist I recommend a trial of Iberogast.       Bloating  Recommend taper off probiotic.       Screening for colon cancer  Colonoscopy 2020, 10-year recall           History of Present Illness   Anuj Oconnell is a 55 y.o. male who presents for follow-up on IBS-D.  He was last seen in the office in January.  At that visit we increased the Viberzi to 100 mg twice daily.  He feel this is helped symptoms overall.  Most days he has a formed stool in the morning.  Then 2 or 3 softer stools later in the day.  There are no specific food triggers.  He feels that Saturday and  are the worst.  On a bad day he could have up to 10 loose stools without a specific trigger.  His appetite good and his weight is stable.  He has been more physically active lifting weights and he feels much stronger  HPI  History obtained from: patient  Review of Systems A complete review of systems is negative other than that noted above in the HPI.    Medical History Reviewed by provider this encounter:  Meds     .  Current Medications[1]  Objective   /78   Ht 5' 10\" (1.778 m)   Wt 114 kg (252 lb)   BMI 36.16 kg/m²     Physical Exam  Constitutional:       Appearance: Normal appearance.   HENT:      Head: Normocephalic and atraumatic.      Nose: Nose normal.     Eyes:      Extraocular Movements: Extraocular movements intact.      Conjunctiva/sclera: Conjunctivae " normal.       Cardiovascular:      Rate and Rhythm: Normal rate and regular rhythm.   Pulmonary:      Effort: Pulmonary effort is normal.      Breath sounds: Normal breath sounds.   Abdominal:      General: Abdomen is flat. Bowel sounds are normal. There is no distension.      Palpations: Abdomen is soft. There is no mass.      Tenderness: There is no guarding.     Musculoskeletal:         General: Normal range of motion.      Cervical back: Normal range of motion.     Skin:     General: Skin is warm and dry.     Neurological:      General: No focal deficit present.      Mental Status: He is alert.     Psychiatric:         Mood and Affect: Mood normal.         Behavior: Behavior normal.            Lab Results: I personally reviewed relevant lab results. CBC/BMP: No new results in last 24 hours.                  [1]   Current Outpatient Medications   Medication Sig Dispense Refill    amitriptyline (ELAVIL) 10 mg tablet Take 4 tablets (40 mg total) by mouth daily at bedtime 360 tablet 3    amLODIPine-valsartan (EXFORGE) 5-160 MG per tablet Take 1 tablet by mouth in the morning.      Cholecalciferol (VITAMIN D3) 50 MCG (2000 UT) capsule Take 4,000 Units by mouth in the morning.      Eluxadoline (Viberzi) 100 MG TABS Take 1 tablet (100 mg total) by mouth 2 (two) times a day 180 tablet 3    fexofenadine (ALLEGRA) 180 MG tablet Take 180 mg by mouth in the morning.      fluticasone (Flonase Sensimist) 27.5 MCG/SPRAY nasal spray into each nostril if needed      LORazepam (ATIVAN) 2 mg tablet Take 2 mg by mouth in the morning and 2 mg at noon and 2 mg in the evening and 2 mg before bedtime.      minoxidil (LONITEN) 2.5 mg tablet Take 2.5 mg by mouth every other day      Misc Natural Products (OSTEO BI-FLEX TRIPLE STRENGTH PO) Take by mouth      montelukast (SINGULAIR) 10 mg tablet Take 10 mg by mouth every 24 hours      Omega-3 Fatty Acids (Fish Oil) 1200 MG CAPS Take 1,200 mg by mouth      rosuvastatin (CRESTOR) 10 MG  tablet Take 10 mg by mouth in the morning      saccharomyces boulardii (FLORASTOR) 250 mg capsule Take 250 mg by mouth in the morning and 250 mg in the evening.       No current facility-administered medications for this visit.

## 2025-06-05 NOTE — PATIENT INSTRUCTIONS
I recommend reducing the probiotic to once daily.  If GI symptoms are unchanged or better you should continue the once daily dose.  If symptoms get worse you can resume 1 pill twice daily.    If the symptoms remain well-controlled on 1 dose per day, you can try stopping when you ran out of the medication while evaluating your symptoms.  You may not need the probiotic long-term.    If symptoms remain stable and you get off the probiotic another supplement we can try is called Iberogast.  It is over-the-counter at most pharmacies and supermarkets.  It works as a prebiotic for the gut bacteria and also helps with small intestine spasticity.  Patient is only need this once daily.   Patient is here to discuss follow up    /83 (BP Location: Right arm, Patient Position: Chair, Cuff Size: Adult Large)   Pulse 101   SpO2 96%     Questions patient would like addressed today are: N/A.    Refills are needed: No    Has homecare services and agency name:  Jocelyne MURRIETA

## 2025-06-06 ENCOUNTER — HOSPITAL ENCOUNTER (EMERGENCY)
Dept: HOSPITAL 99 - EMR | Age: 55
LOS: 1 days | Discharge: HOME | End: 2025-06-07
Payer: COMMERCIAL

## 2025-06-06 VITALS — DIASTOLIC BLOOD PRESSURE: 81 MMHG | SYSTOLIC BLOOD PRESSURE: 114 MMHG

## 2025-06-06 VITALS — DIASTOLIC BLOOD PRESSURE: 89 MMHG | SYSTOLIC BLOOD PRESSURE: 143 MMHG

## 2025-06-06 DIAGNOSIS — R55: Primary | ICD-10-CM

## 2025-06-06 DIAGNOSIS — E78.00: ICD-10-CM

## 2025-06-06 DIAGNOSIS — K59.00: ICD-10-CM

## 2025-06-06 DIAGNOSIS — R10.2: ICD-10-CM

## 2025-06-06 DIAGNOSIS — I10: ICD-10-CM

## 2025-06-06 LAB
ALBUMIN SERPL-MCNC: 5.1 G/DL (ref 3.5–5)
ALP SERPL-CCNC: 68 U/L (ref 38–126)
ALT SERPL-CCNC: 52 U/L (ref 0–50)
AST SERPL-CCNC: 40 U/L (ref 17–59)
BASOPHILS # BLD AUTO: 0 10^3/UL (ref 0–0.2)
BASOPHILS NFR BLD AUTO: 0.5 % (ref 0–2)
BILIRUB SERPL-MCNC: 1.6 MG/DL (ref 0.2–1.3)
BUN SERPL-MCNC: 20 MG/DL (ref 9–20)
CALCIUM SERPL-MCNC: 9.3 MG/DL (ref 8.4–10.2)
CHLORIDE SERPL-SCNC: 106 MMOL/L (ref 98–107)
CO2 SERPL-SCNC: 26 MMOL/L (ref 22–30)
COMMENT: (no result)
CREAT SERPL-MCNC: 1.1 MG/DL (ref 0.7–1.3)
EGFR: > 60
EOSINOPHIL # BLD AUTO: 0.1 10^3/UL (ref 0–0.7)
EOSINOPHIL NFR BLD AUTO: 2 % (ref 0–6)
ERYTHROCYTE [DISTWIDTH] IN BLOOD BY AUTOMATED COUNT: 13.4 % (ref 11.5–14.5)
ESTIMATED CREATININE CLEARANCE: (no result) ML/MIN
ETHANOL SERPL-MCNC: (no result) MG/DL
GLUCOSE SERPL-MCNC: 134 MG/DL (ref 70–99)
HCT VFR BLD AUTO: 40.1 % (ref 39–52)
HGB BLD-MCNC: 13.6 G/DL (ref 13–18)
IMM GRANULOCYTES # BLD AUTO: 0 10^3/UL (ref 0–0.05)
IMM GRANULOCYTES NFR BLD AUTO: 0.2 % (ref 0–0.5)
LIPASE SERPL-CCNC: 225 U/L (ref 23–300)
LYMPHOCYTES # BLD AUTO: 2 10^3/UL (ref 1.2–3.4)
LYMPHOCYTES NFR BLD AUTO: 33.7 % (ref 20.5–51.1)
MCH RBC QN AUTO: 28.7 PG (ref 27–31)
MCHC RBC AUTO-ENTMCNC: 33.9 G/DL (ref 33–37)
MCV RBC AUTO: 84.6 FL (ref 80–94)
MONOCYTES # BLD AUTO: 0.6 10^3/UL (ref 0.1–0.6)
MONOCYTES NFR BLD AUTO: 9.9 % (ref 1.7–9.3)
NEUTROPHILS # BLD AUTO: 3.2 10^3/UL (ref 1.4–6.5)
NEUTROPHILS NFR BLD AUTO: 53.7 % (ref 42.2–75.2)
NRBC BLD AUTO-RTO: 0 %
PLATELET # BLD AUTO: 240 10^3/UL (ref 130–400)
PMV BLD AUTO: 9.7 FL (ref 7.4–10.4)
POTASSIUM SERPL-SCNC: 4.6 MMOL/L (ref 3.5–5.1)
PROT SERPL-MCNC: 8.5 G/DL (ref 6.3–8.2)
RBC # BLD AUTO: 4.74 10^6/UL (ref 4.7–6.1)
SODIUM SERPL-SCNC: 142 MMOL/L (ref 135–145)
WBC # BLD AUTO: 6 10^3/UL (ref 4.8–10.8)

## 2025-06-06 PROCEDURE — 99284 EMERGENCY DEPT VISIT MOD MDM: CPT

## 2025-06-07 VITALS — DIASTOLIC BLOOD PRESSURE: 74 MMHG | SYSTOLIC BLOOD PRESSURE: 122 MMHG

## 2025-06-07 VITALS — SYSTOLIC BLOOD PRESSURE: 146 MMHG | DIASTOLIC BLOOD PRESSURE: 82 MMHG

## 2025-06-07 LAB — TROPONIN I SERPL-MCNC: < 0.012 NG/ML

## 2025-06-10 ENCOUNTER — TELEPHONE (OUTPATIENT)
Dept: GASTROENTEROLOGY | Facility: CLINIC | Age: 55
End: 2025-06-10

## 2025-06-10 NOTE — TELEPHONE ENCOUNTER
Patients GI provider:  Dr. Little    Number to return call: [phone]    Reason for call: Pt calling because he was seen in the ED on 6/6/25. He sent a message through [employer] with all the information from the ED. He would like to know if Dr Little would like to see him sooner or if he should see his PCP.     Scheduled procedure/appointment date if applicable: Appt 12/11/25

## 2025-06-11 NOTE — TELEPHONE ENCOUNTER
Replied to [facility] message.  Patient will bring in the CD from [facility] for my review

## 2025-06-11 NOTE — TELEPHONE ENCOUNTER
Patient calling stating he was just seen in the ER 6/6/25 for a IBS flair up / Patient calling for ER follow up / Please assist with finding availability with Dr Rojas at any location / Patient will await cb

## 2025-06-17 ENCOUNTER — TELEPHONE (OUTPATIENT)
Dept: GASTROENTEROLOGY | Facility: CLINIC | Age: 55
End: 2025-06-17

## 2025-06-17 NOTE — TELEPHONE ENCOUNTER
I called and scheduled an office visit for Dr Little at the [place] office for 9/25/25 at 3 pm per message from Dr Little to see the patient in 3 months.

## 2025-08-06 ENCOUNTER — NURSE TRIAGE (OUTPATIENT)
Dept: GASTROENTEROLOGY | Facility: CLINIC | Age: 55
End: 2025-08-06